# Patient Record
Sex: MALE | Race: WHITE | Employment: OTHER | URBAN - METROPOLITAN AREA
[De-identification: names, ages, dates, MRNs, and addresses within clinical notes are randomized per-mention and may not be internally consistent; named-entity substitution may affect disease eponyms.]

---

## 2018-05-17 ENCOUNTER — HOSPITAL ENCOUNTER (OUTPATIENT)
Dept: MRI IMAGING | Age: 78
Discharge: HOME | End: 2018-05-17
Attending: FAMILY MEDICINE
Payer: MEDICARE

## 2018-05-17 DIAGNOSIS — M54.50 LOW BACK PAIN RADIATING TO RIGHT LEG: ICD-10-CM

## 2018-05-17 DIAGNOSIS — M79.604 LOW BACK PAIN RADIATING TO RIGHT LEG: ICD-10-CM

## 2018-05-17 PROCEDURE — 72148 MRI LUMBAR SPINE W/O DYE: CPT

## 2018-08-21 PROBLEM — M48.061 SPINAL STENOSIS OF LUMBAR REGION: Status: ACTIVE | Noted: 2018-08-21

## 2019-02-12 PROBLEM — I10 ESSENTIAL HYPERTENSION: Status: ACTIVE | Noted: 2019-02-12

## 2019-04-09 ENCOUNTER — HOSPITAL ENCOUNTER (EMERGENCY)
Age: 79
Discharge: LEFT BEFORE DISCHARGE | End: 2019-04-10
Attending: EMERGENCY MEDICINE
Payer: MEDICARE

## 2019-04-09 ENCOUNTER — APPOINTMENT (OUTPATIENT)
Dept: GENERAL RADIOLOGY | Age: 79
End: 2019-04-09
Attending: STUDENT IN AN ORGANIZED HEALTH CARE EDUCATION/TRAINING PROGRAM
Payer: MEDICARE

## 2019-04-09 VITALS
HEIGHT: 65 IN | RESPIRATION RATE: 20 BRPM | TEMPERATURE: 97.8 F | WEIGHT: 175 LBS | BODY MASS INDEX: 29.16 KG/M2 | HEART RATE: 70 BPM | OXYGEN SATURATION: 95 %

## 2019-04-09 LAB
ALBUMIN SERPL-MCNC: 3.9 G/DL (ref 3.2–4.6)
ALBUMIN/GLOB SERPL: 1 {RATIO} (ref 1.2–3.5)
ALP SERPL-CCNC: 108 U/L (ref 50–136)
ALT SERPL-CCNC: 29 U/L (ref 12–65)
ANION GAP SERPL CALC-SCNC: 5 MMOL/L (ref 7–16)
AST SERPL-CCNC: 27 U/L (ref 15–37)
BASOPHILS # BLD: 0.1 K/UL (ref 0–0.2)
BASOPHILS NFR BLD: 1 % (ref 0–2)
BILIRUB SERPL-MCNC: 0.4 MG/DL (ref 0.2–1.1)
BUN SERPL-MCNC: 16 MG/DL (ref 8–23)
CALCIUM SERPL-MCNC: 9.3 MG/DL (ref 8.3–10.4)
CHLORIDE SERPL-SCNC: 111 MMOL/L (ref 98–107)
CO2 SERPL-SCNC: 27 MMOL/L (ref 21–32)
CREAT SERPL-MCNC: 1.27 MG/DL (ref 0.8–1.5)
DIFFERENTIAL METHOD BLD: ABNORMAL
EOSINOPHIL # BLD: 0.2 K/UL (ref 0–0.8)
EOSINOPHIL NFR BLD: 3 % (ref 0.5–7.8)
ERYTHROCYTE [DISTWIDTH] IN BLOOD BY AUTOMATED COUNT: 14.1 % (ref 11.9–14.6)
GLOBULIN SER CALC-MCNC: 4 G/DL (ref 2.3–3.5)
GLUCOSE SERPL-MCNC: 111 MG/DL (ref 65–100)
HCT VFR BLD AUTO: 43.8 % (ref 41.1–50.3)
HGB BLD-MCNC: 14.9 G/DL (ref 13.6–17.2)
IMM GRANULOCYTES # BLD AUTO: 0.1 K/UL (ref 0–0.5)
IMM GRANULOCYTES NFR BLD AUTO: 1 % (ref 0–5)
LYMPHOCYTES # BLD: 3 K/UL (ref 0.5–4.6)
LYMPHOCYTES NFR BLD: 36 % (ref 13–44)
MCH RBC QN AUTO: 29 PG (ref 26.1–32.9)
MCHC RBC AUTO-ENTMCNC: 34 G/DL (ref 31.4–35)
MCV RBC AUTO: 85.4 FL (ref 79.6–97.8)
MONOCYTES # BLD: 0.7 K/UL (ref 0.1–1.3)
MONOCYTES NFR BLD: 8 % (ref 4–12)
NEUTS SEG # BLD: 4.5 K/UL (ref 1.7–8.2)
NEUTS SEG NFR BLD: 53 % (ref 43–78)
NRBC # BLD: 0 K/UL (ref 0–0.2)
PLATELET # BLD AUTO: 326 K/UL (ref 150–450)
PMV BLD AUTO: 9.3 FL (ref 9.4–12.3)
POTASSIUM SERPL-SCNC: 3.7 MMOL/L (ref 3.5–5.1)
PROT SERPL-MCNC: 7.9 G/DL (ref 6.3–8.2)
RBC # BLD AUTO: 5.13 M/UL (ref 4.23–5.6)
SODIUM SERPL-SCNC: 143 MMOL/L (ref 136–145)
TROPONIN I BLD-MCNC: 0 NG/ML (ref 0.02–0.05)
WBC # BLD AUTO: 8.5 K/UL (ref 4.3–11.1)

## 2019-04-09 PROCEDURE — 93005 ELECTROCARDIOGRAM TRACING: CPT | Performed by: STUDENT IN AN ORGANIZED HEALTH CARE EDUCATION/TRAINING PROGRAM

## 2019-04-09 PROCEDURE — 93005 ELECTROCARDIOGRAM TRACING: CPT | Performed by: EMERGENCY MEDICINE

## 2019-04-09 PROCEDURE — 75810000275 HC EMERGENCY DEPT VISIT NO LEVEL OF CARE: Performed by: EMERGENCY MEDICINE

## 2019-04-09 PROCEDURE — 84484 ASSAY OF TROPONIN QUANT: CPT

## 2019-04-09 PROCEDURE — 85025 COMPLETE CBC W/AUTO DIFF WBC: CPT

## 2019-04-09 PROCEDURE — 80053 COMPREHEN METABOLIC PANEL: CPT

## 2019-04-10 LAB
ATRIAL RATE: 72 BPM
CALCULATED P AXIS, ECG09: 61 DEGREES
CALCULATED R AXIS, ECG10: -53 DEGREES
CALCULATED T AXIS, ECG11: 43 DEGREES
DIAGNOSIS, 93000: NORMAL
P-R INTERVAL, ECG05: 158 MS
Q-T INTERVAL, ECG07: 422 MS
QRS DURATION, ECG06: 96 MS
QTC CALCULATION (BEZET), ECG08: 462 MS
VENTRICULAR RATE, ECG03: 72 BPM

## 2019-04-10 NOTE — ED NOTES
Pt informed registration that he is feeling better and would like to leave. Triage removed IV and pt left without seeing physician.

## 2019-04-10 NOTE — ED NOTES
Pt states leaving, ambulatory to triage, 20g to right ac discontinued. Bleeding controlled on leaving ed. Family with pt upon leaving

## 2019-04-10 NOTE — ED TRIAGE NOTES
Patient reports chest pain that started 20 min ago that is mid sternal that is constant, dull P 2/10, no provoking factors. Patient reports no prior cardiac history. Patient does report history of hiatal hernia. Denies taking medication for pain.

## 2019-06-05 PROBLEM — Z01.810 PREOP CARDIOVASCULAR EXAM: Status: ACTIVE | Noted: 2019-06-05

## 2019-06-12 ENCOUNTER — HOSPITAL ENCOUNTER (OUTPATIENT)
Dept: SLEEP MEDICINE | Age: 79
Discharge: HOME | End: 2019-06-12
Payer: MEDICARE

## 2019-06-12 PROCEDURE — 95811 POLYSOM 6/>YRS CPAP 4/> PARM: CPT

## 2019-06-25 PROBLEM — G47.33 OSA (OBSTRUCTIVE SLEEP APNEA): Status: ACTIVE | Noted: 2019-06-25

## 2019-07-09 PROBLEM — R07.2 PRECORDIAL CHEST PAIN: Status: ACTIVE | Noted: 2019-07-09

## 2019-07-10 RX ORDER — ASPIRIN 81 MG/1
81 TABLET ORAL DAILY
COMMUNITY
End: 2022-04-29 | Stop reason: ALTCHOICE

## 2019-07-10 NOTE — PROGRESS NOTES
Patient pre-assessment complete for [facility] poss with Dr Hannah Mcgrath scheduled for 19 at 2pm, arrival time 12pm. Patient verified using . Patient instructed to bring all home medications in labeled bottles on the day of procedure. NPO status reinforced. Patient informed to take a full dose aspirin 325mg  or 81 mg x 4 on the day of procedure. Instructed they can take all other medications excluding vitamins & supplements. Patient verbalizes understanding of all instructions & denies any questions at this time.

## 2019-07-11 ENCOUNTER — HOSPITAL ENCOUNTER (OUTPATIENT)
Dept: CARDIAC CATH/INVASIVE PROCEDURES | Age: 79
Discharge: HOME | End: 2019-07-11
Attending: INTERNAL MEDICINE | Admitting: INTERNAL MEDICINE
Payer: MEDICARE

## 2019-07-11 VITALS
HEIGHT: 65 IN | SYSTOLIC BLOOD PRESSURE: 141 MMHG | DIASTOLIC BLOOD PRESSURE: 81 MMHG | RESPIRATION RATE: 15 BRPM | WEIGHT: 175 LBS | OXYGEN SATURATION: 97 % | BODY MASS INDEX: 29.16 KG/M2 | HEART RATE: 68 BPM

## 2019-07-11 LAB
ANION GAP SERPL CALC-SCNC: 6 MMOL/L (ref 7–16)
ATRIAL RATE: 77 BPM
BUN SERPL-MCNC: 16 MG/DL (ref 8–23)
CALCIUM SERPL-MCNC: 9.2 MG/DL (ref 8.3–10.4)
CALCULATED P AXIS, ECG09: 53 DEGREES
CALCULATED R AXIS, ECG10: -36 DEGREES
CALCULATED T AXIS, ECG11: 42 DEGREES
CHLORIDE SERPL-SCNC: 114 MMOL/L (ref 98–107)
CO2 SERPL-SCNC: 24 MMOL/L (ref 21–32)
CREAT SERPL-MCNC: 1.22 MG/DL (ref 0.8–1.5)
DIAGNOSIS, 93000: NORMAL
ERYTHROCYTE [DISTWIDTH] IN BLOOD BY AUTOMATED COUNT: 13.5 % (ref 11.9–14.6)
GLUCOSE SERPL-MCNC: 94 MG/DL (ref 65–100)
HCT VFR BLD AUTO: 44 % (ref 41.1–50.3)
HGB BLD-MCNC: 15.2 G/DL (ref 13.6–17.2)
INR PPP: 1.1
MAGNESIUM SERPL-MCNC: 2.5 MG/DL (ref 1.8–2.4)
MCH RBC QN AUTO: 28.7 PG (ref 26.1–32.9)
MCHC RBC AUTO-ENTMCNC: 34.5 G/DL (ref 31.4–35)
MCV RBC AUTO: 83.2 FL (ref 79.6–97.8)
NRBC # BLD: 0 K/UL (ref 0–0.2)
P-R INTERVAL, ECG05: 162 MS
PLATELET # BLD AUTO: 313 K/UL (ref 150–450)
PMV BLD AUTO: 9.4 FL (ref 9.4–12.3)
POTASSIUM SERPL-SCNC: 4 MMOL/L (ref 3.5–5.1)
PROTHROMBIN TIME: 13.9 SEC (ref 11.7–14.5)
Q-T INTERVAL, ECG07: 402 MS
QRS DURATION, ECG06: 94 MS
QTC CALCULATION (BEZET), ECG08: 454 MS
RBC # BLD AUTO: 5.29 M/UL (ref 4.23–5.6)
SODIUM SERPL-SCNC: 144 MMOL/L (ref 136–145)
VENTRICULAR RATE, ECG03: 77 BPM
WBC # BLD AUTO: 8.9 K/UL (ref 4.3–11.1)

## 2019-07-11 PROCEDURE — C1769 GUIDE WIRE: HCPCS

## 2019-07-11 PROCEDURE — 83735 ASSAY OF MAGNESIUM: CPT

## 2019-07-11 PROCEDURE — 80048 BASIC METABOLIC PNL TOTAL CA: CPT

## 2019-07-11 PROCEDURE — C1894 INTRO/SHEATH, NON-LASER: HCPCS

## 2019-07-11 PROCEDURE — 77030015766

## 2019-07-11 PROCEDURE — 85610 PROTHROMBIN TIME: CPT

## 2019-07-11 PROCEDURE — 99152 MOD SED SAME PHYS/QHP 5/>YRS: CPT

## 2019-07-11 PROCEDURE — 77030019569 HC BND COMPR RAD TERU -B

## 2019-07-11 PROCEDURE — 74011000250 HC RX REV CODE- 250: Performed by: INTERNAL MEDICINE

## 2019-07-11 PROCEDURE — 74011636320 HC RX REV CODE- 636/320: Performed by: INTERNAL MEDICINE

## 2019-07-11 PROCEDURE — 93458 L HRT ARTERY/VENTRICLE ANGIO: CPT

## 2019-07-11 PROCEDURE — 93005 ELECTROCARDIOGRAM TRACING: CPT | Performed by: INTERNAL MEDICINE

## 2019-07-11 PROCEDURE — 74011250636 HC RX REV CODE- 250/636: Performed by: INTERNAL MEDICINE

## 2019-07-11 PROCEDURE — 93306 TTE W/DOPPLER COMPLETE: CPT

## 2019-07-11 PROCEDURE — 85027 COMPLETE CBC AUTOMATED: CPT

## 2019-07-11 PROCEDURE — 74011250636 HC RX REV CODE- 250/636

## 2019-07-11 PROCEDURE — 77030004534 HC CATH ANGI DX INFN CARD -A

## 2019-07-11 RX ORDER — HEPARIN SODIUM 200 [USP'U]/100ML
3 INJECTION, SOLUTION INTRAVENOUS CONTINUOUS
Status: DISCONTINUED | OUTPATIENT
Start: 2019-07-11 | End: 2019-07-11 | Stop reason: HOSPADM

## 2019-07-11 RX ORDER — SODIUM CHLORIDE 0.9 % (FLUSH) 0.9 %
5-40 SYRINGE (ML) INJECTION EVERY 8 HOURS
Status: DISCONTINUED | OUTPATIENT
Start: 2019-07-11 | End: 2019-07-11 | Stop reason: HOSPADM

## 2019-07-11 RX ORDER — SODIUM CHLORIDE 0.9 % (FLUSH) 0.9 %
5-40 SYRINGE (ML) INJECTION AS NEEDED
Status: DISCONTINUED | OUTPATIENT
Start: 2019-07-11 | End: 2019-07-11 | Stop reason: HOSPADM

## 2019-07-11 RX ORDER — MIDAZOLAM HYDROCHLORIDE 1 MG/ML
.5-2 INJECTION, SOLUTION INTRAMUSCULAR; INTRAVENOUS
Status: DISCONTINUED | OUTPATIENT
Start: 2019-07-11 | End: 2019-07-11 | Stop reason: HOSPADM

## 2019-07-11 RX ORDER — SODIUM CHLORIDE 9 MG/ML
75 INJECTION, SOLUTION INTRAVENOUS CONTINUOUS
Status: DISCONTINUED | OUTPATIENT
Start: 2019-07-11 | End: 2019-07-11 | Stop reason: HOSPADM

## 2019-07-11 RX ORDER — LIDOCAINE HYDROCHLORIDE 10 MG/ML
10-200 INJECTION INFILTRATION; PERINEURAL ONCE
Status: COMPLETED | OUTPATIENT
Start: 2019-07-11 | End: 2019-07-11

## 2019-07-11 RX ORDER — SODIUM CHLORIDE 0.9 % (FLUSH) 0.9 %
5 SYRINGE (ML) INJECTION AS NEEDED
Status: DISCONTINUED | OUTPATIENT
Start: 2019-07-11 | End: 2019-07-11 | Stop reason: HOSPADM

## 2019-07-11 RX ORDER — GUAIFENESIN 100 MG/5ML
324 LIQUID (ML) ORAL ONCE
Status: DISCONTINUED | OUTPATIENT
Start: 2019-07-11 | End: 2019-07-11 | Stop reason: HOSPADM

## 2019-07-11 RX ORDER — FENTANYL CITRATE 50 UG/ML
25-50 INJECTION, SOLUTION INTRAMUSCULAR; INTRAVENOUS
Status: DISCONTINUED | OUTPATIENT
Start: 2019-07-11 | End: 2019-07-11 | Stop reason: HOSPADM

## 2019-07-11 RX ADMIN — FENTANYL CITRATE 25 MCG: 50 INJECTION, SOLUTION INTRAMUSCULAR; INTRAVENOUS at 14:50

## 2019-07-11 RX ADMIN — HEPARIN SODIUM 2 ML: 10000 INJECTION, SOLUTION INTRAVENOUS; SUBCUTANEOUS at 14:50

## 2019-07-11 RX ADMIN — SODIUM CHLORIDE 75 ML/HR: 900 INJECTION, SOLUTION INTRAVENOUS at 12:47

## 2019-07-11 RX ADMIN — MIDAZOLAM HYDROCHLORIDE 2 MG: 1 INJECTION, SOLUTION INTRAMUSCULAR; INTRAVENOUS at 14:50

## 2019-07-11 RX ADMIN — LIDOCAINE HYDROCHLORIDE 2 ML: 10 INJECTION, SOLUTION INFILTRATION; PERINEURAL at 14:50

## 2019-07-11 RX ADMIN — HEPARIN SODIUM 3 ML/HR: 5000 INJECTION, SOLUTION INTRAVENOUS; SUBCUTANEOUS at 14:40

## 2019-07-11 RX ADMIN — IOPAMIDOL 80 ML: 755 INJECTION, SOLUTION INTRAVENOUS at 15:05

## 2019-07-11 NOTE — PROGRESS NOTES
TRANSFER - OUT REPORT:    Verbal report given to Cesar Fernandez on [place].  being transferred to [address] [place] [address] for routine progression of care       Report consisted of patients Situation, Background, Assessment and Recommendations(SBAR). Information from the following report(s) SBAR, Kardex, Procedure Summary and MAR was reviewed with the receiving nurse. Opportunity for questions and clarification was provided.       [facility] with Dr Leydi Bentley  No intervention  2 versed  25 fentanyl  Right radial RBand 12ml at [phone]

## 2019-07-11 NOTE — PROGRESS NOTES
Patient up to bedside, vital signs and site stable. Patient ambulated to bathroom without difficulty. Patient voided without difficulty. Vascular site stable. 1645 Discharge instructions and home medications reviewed with patient. Time allowed for questions and answers. 1700 Patient ambulated second time without difficulty. Site stable after ambulation. Peripheral IV sites dc'd without difficulty with tips intact. 1710 Patient discharged to home with family.

## 2019-07-11 NOTE — DISCHARGE INSTRUCTIONS
HEART CATHETERIZATION/ANGIOGRAPHY DISCHARGE INSTRUCTIONS    1. Check puncture site frequently for swelling or bleeding. If there is any bleeding, lie down and apply pressure over the area with a clean towel or washcloth and call 911. Notify your doctor for any redness, swelling, drainage, or oozing from the puncture site. Notify your doctor for any fever or chills. 2. If the extremity becomes cold, numb, or painful call [address] Cardiology at [phone].  3. Activity should be limited for the next 48 hours. No heavy lifting (anything over 5 pounds) for 3 days. 4. You may resume your usual diet. Drink more fluids than usual.  5. Have a responsible person drive you home and stay with you for at least 24 hours after your heart catheterization/angiography. 6. You may remove bandage from your Right wrist in 24 hours. You may shower in 24 hours. No tub baths, hot tubs, or swimming for 1 week. Do not place any lotions, creams, powders, or ointments over puncture site for 1 week. You may place a clean band-aid over the puncture site each day for 5 days. Change daily. I have read the above instructions and have had the opportunity to ask questions.       Patient: ________________________   Date: 7/11/2019    Witness: _______________________   Date: 7/11/2019

## 2019-07-11 NOTE — PROGRESS NOTES
Patient's family at bedside, patient and family updated on plan of care. Patient given meal tray and beverage, call light within reach.

## 2019-07-11 NOTE — PROGRESS NOTES
Report received from Juanis Agee Cath Lab RN. Procedural findings communicated. Intra procedural  medication administration reviewed. Progression of care discussed.      Patient received into [address] post sheath removal.     Access site without bleeding or swelling yes    Dressing dry and intact yes    Patient instructed to limit movement to right upper extremity    Routine post procedural vital signs and site assessment initiated yes

## 2019-07-11 NOTE — PROGRESS NOTES
Patient received to [address] [place] [address] room # 9  Ambulatory from [facility]. Patient scheduled for [facility] today with Dr Rojelio Jimenez. Procedure reviewed & questions answered, voiced good understanding consent obtained & placed on chart. All medications and medical history reviewed. Will prep patient per orders. Patient & family updated on plan of care. The patient has a fraility score of 3-MANAGING WELL based on ability to perform ADLS by self.

## 2019-07-12 NOTE — PROCEDURES
[facility]  CARDIAC CATH    Name:  Fabiana Cortés  MR#:  [id number]  :  1940  ACCOUNT #:  [de-identified]  DATE OF SERVICE:  2019      PROCEDURES PERFORMED:  Left heart catheterization via the right radial artery with a 5-Kiswahili Tiger catheter and angled pigtail. TR band for hemostasis was used. PREOPERATIVE DIAGNOSES:  Chest pain and abnormal stress test with a large defect showing infarction and susy-infarct ischemia in the inferolateral wall, referred for catheterization. POSTOPERATIVE DIAGNOSES:  noncardiac cp. SURGEON:  Nadir Garcia MD    ASSISTANT:  None. ESTIMATED BLOOD LOSS:  Less than 5 mL. SPECIMENS REMOVED:  None. COMPLICATIONS:  None. IMPLANTS:  None. ANESTHESIA:  Conscious sedation was given under my direct supervision by Alma Valdes RN, beginning at 02:50, concluding at 03:20. A total of 2 mg Versed and 25 mcg of fentanyl were given. Vital signs and saturation were stable throughout. FINDINGS:  Left ventriculogram reveals normal LV systolic function. Ejection fraction is 60%. Left ventricular end-diastolic pressure is 12 mmHg with an opening aortic pressure of 130/78. No gradient across the aortic valve. The left main coronary artery is in the normal anatomic position, free of significant disease, bifurcates into LAD and circumflex system. The LAD has 2 small diagonal branches, tapers out before the apex. There is no atherosclerosis seen in the LAD system. Circumflex has a moderate-sized midvessel obtuse marginal, small distal obtuse marginal branch. There is no atherosclerosis seen in the circumflex system. The right coronary artery is a large dominant vessel, normal anatomic position. There is mild irregularities in the distal portion of the RCA of approximately 10%. The PDA and posterolateral obtuse marginal branches are free of significant disease. CONCLUSION:  1.   Normal coronary angiography with minimal coronary artery disease. 2.  Mildly dilated aorta. RECOMMENDATIONS:  The patient will have an echocardiogram and if the aorta is significantly dilated, consideration for outpatient CT scan will be performed.       Umang Galindo MD      [id number]  D:  07/11/2019 15:15  T:  07/11/2019 15:23  JOB #:  [id number]

## 2019-09-17 PROBLEM — K59.09 OTHER CONSTIPATION: Status: ACTIVE | Noted: 2019-09-17

## 2019-09-20 PROBLEM — Z01.810 PREOP CARDIOVASCULAR EXAM: Status: RESOLVED | Noted: 2019-06-05 | Resolved: 2019-09-20

## 2019-09-23 ENCOUNTER — HOSPITAL ENCOUNTER (OUTPATIENT)
Dept: ULTRASOUND IMAGING | Age: 79
Discharge: HOME | End: 2019-09-23
Attending: UROLOGY
Payer: MEDICARE

## 2019-09-23 DIAGNOSIS — R31.0 GROSS HEMATURIA: ICD-10-CM

## 2019-09-23 PROCEDURE — 76770 US EXAM ABDO BACK WALL COMP: CPT

## 2020-01-09 ENCOUNTER — HOSPITAL ENCOUNTER (OUTPATIENT)
Dept: LAB | Age: 80
Discharge: HOME | End: 2020-01-09

## 2020-01-09 PROCEDURE — 88305 TISSUE EXAM BY PATHOLOGIST: CPT

## 2021-01-12 PROBLEM — Z78.9 DIFFICULTY WITH CPAP FULL FACE MASK USE: Status: ACTIVE | Noted: 2021-01-12

## 2021-01-16 ENCOUNTER — APPOINTMENT (OUTPATIENT)
Dept: CT IMAGING | Age: 81
DRG: 176 | End: 2021-01-16
Attending: STUDENT IN AN ORGANIZED HEALTH CARE EDUCATION/TRAINING PROGRAM
Payer: MEDICARE

## 2021-01-16 ENCOUNTER — HOSPITAL ENCOUNTER (INPATIENT)
Age: 81
LOS: 1 days | Discharge: HOME | DRG: 176 | End: 2021-01-19
Attending: STUDENT IN AN ORGANIZED HEALTH CARE EDUCATION/TRAINING PROGRAM | Admitting: INTERNAL MEDICINE
Payer: MEDICARE

## 2021-01-16 ENCOUNTER — APPOINTMENT (OUTPATIENT)
Dept: GENERAL RADIOLOGY | Age: 81
DRG: 176 | End: 2021-01-16
Attending: STUDENT IN AN ORGANIZED HEALTH CARE EDUCATION/TRAINING PROGRAM
Payer: MEDICARE

## 2021-01-16 DIAGNOSIS — I26.99 OTHER ACUTE PULMONARY EMBOLISM WITHOUT ACUTE COR PULMONALE (HCC): ICD-10-CM

## 2021-01-16 DIAGNOSIS — I26.94 MULTIPLE SUBSEGMENTAL PULMONARY EMBOLI WITHOUT ACUTE COR PULMONALE (HCC): Primary | ICD-10-CM

## 2021-01-16 PROBLEM — R07.2 PRECORDIAL CHEST PAIN: Status: RESOLVED | Noted: 2019-07-09 | Resolved: 2021-01-16

## 2021-01-16 PROBLEM — D72.829 LEUKOCYTOSIS: Status: ACTIVE | Noted: 2021-01-16

## 2021-01-16 LAB
ALBUMIN SERPL-MCNC: 3.4 G/DL (ref 3.2–4.6)
ALBUMIN/GLOB SERPL: 0.7 {RATIO} (ref 1.2–3.5)
ALP SERPL-CCNC: 102 U/L (ref 50–136)
ALT SERPL-CCNC: 38 U/L (ref 12–65)
ANION GAP SERPL CALC-SCNC: 7 MMOL/L (ref 7–16)
APTT PPP: 31.8 SEC (ref 24.1–35.1)
AST SERPL-CCNC: 28 U/L (ref 15–37)
BASOPHILS # BLD: 0.1 K/UL (ref 0–0.2)
BASOPHILS NFR BLD: 0 % (ref 0–2)
BILIRUB SERPL-MCNC: 0.8 MG/DL (ref 0.2–1.1)
BNP SERPL-MCNC: 225 PG/ML
BUN SERPL-MCNC: 13 MG/DL (ref 8–23)
CALCIUM SERPL-MCNC: 9.2 MG/DL (ref 8.3–10.4)
CHLORIDE SERPL-SCNC: 109 MMOL/L (ref 98–107)
CO2 SERPL-SCNC: 27 MMOL/L (ref 21–32)
CREAT SERPL-MCNC: 1.06 MG/DL (ref 0.8–1.5)
DIFFERENTIAL METHOD BLD: ABNORMAL
EOSINOPHIL # BLD: 0.2 K/UL (ref 0–0.8)
EOSINOPHIL NFR BLD: 1 % (ref 0.5–7.8)
ERYTHROCYTE [DISTWIDTH] IN BLOOD BY AUTOMATED COUNT: 13.9 % (ref 11.9–14.6)
GLOBULIN SER CALC-MCNC: 4.6 G/DL (ref 2.3–3.5)
GLUCOSE SERPL-MCNC: 109 MG/DL (ref 65–100)
HCT VFR BLD AUTO: 41.7 % (ref 41.1–50.3)
HGB BLD-MCNC: 13.9 G/DL (ref 13.6–17.2)
IMM GRANULOCYTES # BLD AUTO: 0.1 K/UL (ref 0–0.5)
IMM GRANULOCYTES NFR BLD AUTO: 1 % (ref 0–5)
LYMPHOCYTES # BLD: 2.8 K/UL (ref 0.5–4.6)
LYMPHOCYTES NFR BLD: 23 % (ref 13–44)
MCH RBC QN AUTO: 28.1 PG (ref 26.1–32.9)
MCHC RBC AUTO-ENTMCNC: 33.3 G/DL (ref 31.4–35)
MCV RBC AUTO: 84.4 FL (ref 79.6–97.8)
MONOCYTES # BLD: 0.9 K/UL (ref 0.1–1.3)
MONOCYTES NFR BLD: 7 % (ref 4–12)
NEUTS SEG # BLD: 8.2 K/UL (ref 1.7–8.2)
NEUTS SEG NFR BLD: 67 % (ref 43–78)
NRBC # BLD: 0 K/UL (ref 0–0.2)
PLATELET # BLD AUTO: 262 K/UL (ref 150–450)
PMV BLD AUTO: 9 FL (ref 9.4–12.3)
POTASSIUM SERPL-SCNC: 3.6 MMOL/L (ref 3.5–5.1)
PROT SERPL-MCNC: 8 G/DL (ref 6.3–8.2)
RBC # BLD AUTO: 4.94 M/UL (ref 4.23–5.6)
SODIUM SERPL-SCNC: 143 MMOL/L (ref 136–145)
TROPONIN-HIGH SENSITIVITY: 14.3 PG/ML (ref 0–14)
WBC # BLD AUTO: 12.2 K/UL (ref 4.3–11.1)

## 2021-01-16 PROCEDURE — 74011000258 HC RX REV CODE- 258: Performed by: STUDENT IN AN ORGANIZED HEALTH CARE EDUCATION/TRAINING PROGRAM

## 2021-01-16 PROCEDURE — 96375 TX/PRO/DX INJ NEW DRUG ADDON: CPT

## 2021-01-16 PROCEDURE — 74011000636 HC RX REV CODE- 636: Performed by: STUDENT IN AN ORGANIZED HEALTH CARE EDUCATION/TRAINING PROGRAM

## 2021-01-16 PROCEDURE — 85730 THROMBOPLASTIN TIME PARTIAL: CPT

## 2021-01-16 PROCEDURE — 96376 TX/PRO/DX INJ SAME DRUG ADON: CPT

## 2021-01-16 PROCEDURE — 84484 ASSAY OF TROPONIN QUANT: CPT

## 2021-01-16 PROCEDURE — 83880 ASSAY OF NATRIURETIC PEPTIDE: CPT

## 2021-01-16 PROCEDURE — 80053 COMPREHEN METABOLIC PANEL: CPT

## 2021-01-16 PROCEDURE — 74011250636 HC RX REV CODE- 250/636: Performed by: STUDENT IN AN ORGANIZED HEALTH CARE EDUCATION/TRAINING PROGRAM

## 2021-01-16 PROCEDURE — 85025 COMPLETE CBC W/AUTO DIFF WBC: CPT

## 2021-01-16 PROCEDURE — 93005 ELECTROCARDIOGRAM TRACING: CPT | Performed by: STUDENT IN AN ORGANIZED HEALTH CARE EDUCATION/TRAINING PROGRAM

## 2021-01-16 PROCEDURE — 71260 CT THORAX DX C+: CPT

## 2021-01-16 PROCEDURE — 99218 HC RM OBSERVATION: CPT

## 2021-01-16 PROCEDURE — 71046 X-RAY EXAM CHEST 2 VIEWS: CPT

## 2021-01-16 PROCEDURE — 99284 EMERGENCY DEPT VISIT MOD MDM: CPT

## 2021-01-16 PROCEDURE — 96374 THER/PROPH/DIAG INJ IV PUSH: CPT

## 2021-01-16 RX ORDER — KETOROLAC TROMETHAMINE 30 MG/ML
30 INJECTION, SOLUTION INTRAMUSCULAR; INTRAVENOUS
Status: COMPLETED | OUTPATIENT
Start: 2021-01-16 | End: 2021-01-16

## 2021-01-16 RX ORDER — SODIUM CHLORIDE 0.9 % (FLUSH) 0.9 %
10 SYRINGE (ML) INJECTION
Status: COMPLETED | OUTPATIENT
Start: 2021-01-16 | End: 2021-01-16

## 2021-01-16 RX ORDER — KETOROLAC TROMETHAMINE 30 MG/ML
15 INJECTION, SOLUTION INTRAMUSCULAR; INTRAVENOUS
Status: DISCONTINUED | OUTPATIENT
Start: 2021-01-16 | End: 2021-01-16

## 2021-01-16 RX ORDER — HEPARIN SODIUM 5000 [USP'U]/ML
80 INJECTION, SOLUTION INTRAVENOUS; SUBCUTANEOUS ONCE
Status: COMPLETED | OUTPATIENT
Start: 2021-01-16 | End: 2021-01-17

## 2021-01-16 RX ORDER — MORPHINE SULFATE 4 MG/ML
4 INJECTION INTRAVENOUS
Status: COMPLETED | OUTPATIENT
Start: 2021-01-16 | End: 2021-01-16

## 2021-01-16 RX ORDER — ONDANSETRON 2 MG/ML
4 INJECTION INTRAMUSCULAR; INTRAVENOUS
Status: COMPLETED | OUTPATIENT
Start: 2021-01-16 | End: 2021-01-16

## 2021-01-16 RX ORDER — HEPARIN SODIUM 5000 [USP'U]/100ML
18-36 INJECTION, SOLUTION INTRAVENOUS
Status: DISCONTINUED | OUTPATIENT
Start: 2021-01-16 | End: 2021-01-18

## 2021-01-16 RX ADMIN — KETOROLAC TROMETHAMINE 30 MG: 30 INJECTION, SOLUTION INTRAMUSCULAR at 21:53

## 2021-01-16 RX ADMIN — Medication 10 ML: at 22:11

## 2021-01-16 RX ADMIN — IOPAMIDOL 75 ML: 755 INJECTION, SOLUTION INTRAVENOUS at 22:11

## 2021-01-16 RX ADMIN — ONDANSETRON 4 MG: 2 INJECTION INTRAMUSCULAR; INTRAVENOUS at 22:37

## 2021-01-16 RX ADMIN — MORPHINE SULFATE 4 MG: 4 INJECTION INTRAVENOUS at 22:37

## 2021-01-16 RX ADMIN — SODIUM CHLORIDE 100 ML: 900 INJECTION, SOLUTION INTRAVENOUS at 22:11

## 2021-01-17 LAB
ANION GAP SERPL CALC-SCNC: 6 MMOL/L (ref 7–16)
APTT PPP: 94.6 SEC (ref 24.1–35.1)
ATRIAL RATE: 394 BPM
BASOPHILS # BLD: 0.1 K/UL (ref 0–0.2)
BASOPHILS NFR BLD: 1 % (ref 0–2)
BUN SERPL-MCNC: 12 MG/DL (ref 8–23)
CALCIUM SERPL-MCNC: 9 MG/DL (ref 8.3–10.4)
CALCULATED R AXIS, ECG10: -43 DEGREES
CALCULATED T AXIS, ECG11: 17 DEGREES
CHLORIDE SERPL-SCNC: 109 MMOL/L (ref 98–107)
CO2 SERPL-SCNC: 25 MMOL/L (ref 21–32)
CREAT SERPL-MCNC: 0.91 MG/DL (ref 0.8–1.5)
DIAGNOSIS, 93000: NORMAL
DIFFERENTIAL METHOD BLD: ABNORMAL
EOSINOPHIL # BLD: 0.1 K/UL (ref 0–0.8)
EOSINOPHIL NFR BLD: 1 % (ref 0.5–7.8)
ERYTHROCYTE [DISTWIDTH] IN BLOOD BY AUTOMATED COUNT: 13.8 % (ref 11.9–14.6)
GLUCOSE SERPL-MCNC: 97 MG/DL (ref 65–100)
HCT VFR BLD AUTO: 38.3 % (ref 41.1–50.3)
HGB BLD-MCNC: 13 G/DL (ref 13.6–17.2)
IMM GRANULOCYTES # BLD AUTO: 0.1 K/UL (ref 0–0.5)
IMM GRANULOCYTES NFR BLD AUTO: 1 % (ref 0–5)
LYMPHOCYTES # BLD: 2.1 K/UL (ref 0.5–4.6)
LYMPHOCYTES NFR BLD: 20 % (ref 13–44)
MCH RBC QN AUTO: 28.9 PG (ref 26.1–32.9)
MCHC RBC AUTO-ENTMCNC: 33.9 G/DL (ref 31.4–35)
MCV RBC AUTO: 85.1 FL (ref 79.6–97.8)
MONOCYTES # BLD: 0.9 K/UL (ref 0.1–1.3)
MONOCYTES NFR BLD: 9 % (ref 4–12)
NEUTS SEG # BLD: 7.1 K/UL (ref 1.7–8.2)
NEUTS SEG NFR BLD: 69 % (ref 43–78)
NRBC # BLD: 0 K/UL (ref 0–0.2)
PLATELET # BLD AUTO: 227 K/UL (ref 150–450)
PMV BLD AUTO: 9.4 FL (ref 9.4–12.3)
POTASSIUM SERPL-SCNC: 3.6 MMOL/L (ref 3.5–5.1)
Q-T INTERVAL, ECG07: 338 MS
QRS DURATION, ECG06: 90 MS
QTC CALCULATION (BEZET), ECG08: 415 MS
RBC # BLD AUTO: 4.5 M/UL (ref 4.23–5.6)
SODIUM SERPL-SCNC: 140 MMOL/L (ref 136–145)
UFH PPP CHRO-ACNC: 0.37 IU/ML (ref 0.3–0.7)
UFH PPP CHRO-ACNC: 0.53 IU/ML (ref 0.3–0.7)
VENTRICULAR RATE, ECG03: 91 BPM
WBC # BLD AUTO: 10.3 K/UL (ref 4.3–11.1)

## 2021-01-17 PROCEDURE — 85025 COMPLETE CBC W/AUTO DIFF WBC: CPT

## 2021-01-17 PROCEDURE — 74011250636 HC RX REV CODE- 250/636: Performed by: FAMILY MEDICINE

## 2021-01-17 PROCEDURE — 80048 BASIC METABOLIC PNL TOTAL CA: CPT

## 2021-01-17 PROCEDURE — 85520 HEPARIN ASSAY: CPT

## 2021-01-17 PROCEDURE — 74011250637 HC RX REV CODE- 250/637: Performed by: INTERNAL MEDICINE

## 2021-01-17 PROCEDURE — 96365 THER/PROPH/DIAG IV INF INIT: CPT

## 2021-01-17 PROCEDURE — 99218 HC RM OBSERVATION: CPT

## 2021-01-17 PROCEDURE — 96376 TX/PRO/DX INJ SAME DRUG ADON: CPT

## 2021-01-17 PROCEDURE — 96366 THER/PROPH/DIAG IV INF ADDON: CPT

## 2021-01-17 PROCEDURE — 74011250636 HC RX REV CODE- 250/636: Performed by: STUDENT IN AN ORGANIZED HEALTH CARE EDUCATION/TRAINING PROGRAM

## 2021-01-17 PROCEDURE — 85730 THROMBOPLASTIN TIME PARTIAL: CPT

## 2021-01-17 PROCEDURE — 36415 COLL VENOUS BLD VENIPUNCTURE: CPT

## 2021-01-17 PROCEDURE — 74011250637 HC RX REV CODE- 250/637: Performed by: FAMILY MEDICINE

## 2021-01-17 PROCEDURE — 93306 TTE W/DOPPLER COMPLETE: CPT

## 2021-01-17 RX ORDER — HEPARIN SODIUM 5000 [USP'U]/100ML
18-36 INJECTION, SOLUTION INTRAVENOUS
Status: DISCONTINUED | OUTPATIENT
Start: 2021-01-17 | End: 2021-01-17

## 2021-01-17 RX ORDER — ASPIRIN 81 MG/1
81 TABLET ORAL DAILY
Status: DISCONTINUED | OUTPATIENT
Start: 2021-01-17 | End: 2021-01-19 | Stop reason: HOSPADM

## 2021-01-17 RX ORDER — POLYETHYLENE GLYCOL 3350 17 G/17G
17 POWDER, FOR SOLUTION ORAL DAILY
Status: DISCONTINUED | OUTPATIENT
Start: 2021-01-17 | End: 2021-01-19 | Stop reason: HOSPADM

## 2021-01-17 RX ORDER — SODIUM CHLORIDE 0.9 % (FLUSH) 0.9 %
5-40 SYRINGE (ML) INJECTION AS NEEDED
Status: DISCONTINUED | OUTPATIENT
Start: 2021-01-17 | End: 2021-01-19 | Stop reason: HOSPADM

## 2021-01-17 RX ORDER — OXYCODONE AND ACETAMINOPHEN 7.5; 325 MG/1; MG/1
1 TABLET ORAL
Status: DISCONTINUED | OUTPATIENT
Start: 2021-01-17 | End: 2021-01-19 | Stop reason: HOSPADM

## 2021-01-17 RX ORDER — MORPHINE SULFATE 2 MG/ML
2 INJECTION, SOLUTION INTRAMUSCULAR; INTRAVENOUS
Status: DISCONTINUED | OUTPATIENT
Start: 2021-01-17 | End: 2021-01-19 | Stop reason: HOSPADM

## 2021-01-17 RX ORDER — PANTOPRAZOLE SODIUM 40 MG/1
40 TABLET, DELAYED RELEASE ORAL
Status: DISCONTINUED | OUTPATIENT
Start: 2021-01-17 | End: 2021-01-19 | Stop reason: HOSPADM

## 2021-01-17 RX ORDER — SODIUM CHLORIDE 0.9 % (FLUSH) 0.9 %
5-40 SYRINGE (ML) INJECTION EVERY 8 HOURS
Status: DISCONTINUED | OUTPATIENT
Start: 2021-01-17 | End: 2021-01-19 | Stop reason: HOSPADM

## 2021-01-17 RX ADMIN — Medication 10 ML: at 04:17

## 2021-01-17 RX ADMIN — HEPARIN SODIUM 18 UNITS/KG/HR: 5000 INJECTION, SOLUTION INTRAVENOUS at 00:40

## 2021-01-17 RX ADMIN — PANTOPRAZOLE SODIUM 40 MG: 40 TABLET, DELAYED RELEASE ORAL at 04:17

## 2021-01-17 RX ADMIN — HEPARIN SODIUM 18 UNITS/KG/HR: 5000 INJECTION, SOLUTION INTRAVENOUS at 19:11

## 2021-01-17 RX ADMIN — MORPHINE SULFATE 2 MG: 2 INJECTION, SOLUTION INTRAMUSCULAR; INTRAVENOUS at 11:26

## 2021-01-17 RX ADMIN — PANTOPRAZOLE SODIUM 40 MG: 40 TABLET, DELAYED RELEASE ORAL at 16:28

## 2021-01-17 RX ADMIN — POLYETHYLENE GLYCOL 3350 17 G: 17 POWDER, FOR SOLUTION ORAL at 09:48

## 2021-01-17 RX ADMIN — MORPHINE SULFATE 2 MG: 2 INJECTION, SOLUTION INTRAMUSCULAR; INTRAVENOUS at 19:25

## 2021-01-17 RX ADMIN — OXYCODONE HYDROCHLORIDE AND ACETAMINOPHEN 1 TABLET: 7.5; 325 TABLET ORAL at 13:28

## 2021-01-17 RX ADMIN — MORPHINE SULFATE 2 MG: 2 INJECTION, SOLUTION INTRAMUSCULAR; INTRAVENOUS at 04:17

## 2021-01-17 RX ADMIN — Medication 10 ML: at 22:24

## 2021-01-17 RX ADMIN — OXYCODONE HYDROCHLORIDE AND ACETAMINOPHEN 1 TABLET: 7.5; 325 TABLET ORAL at 23:50

## 2021-01-17 RX ADMIN — HEPARIN SODIUM 6300 UNITS: 5000 INJECTION INTRAVENOUS; SUBCUTANEOUS at 00:29

## 2021-01-17 RX ADMIN — Medication 5 ML: at 14:33

## 2021-01-17 RX ADMIN — ASPIRIN 81 MG: 81 TABLET ORAL at 09:48

## 2021-01-17 NOTE — ED PROVIDER NOTES
79-year-old male patient with a history of pulmonary embolism presents to the emergency department with reports of right-sided chest wall pain. Symptoms started yesterday. Patient states he has been working in his yard and feels as though he may have pulled a muscle. He describes lifting heavy bags of yard waste and carrying them across the yard. He denies any specific falls or trauma. Pain is described as a sharp pain in the right chest just under the nipple that is constant nature and worsened with palpation and movement. He states the pain does not radiate at this time. He does report significant shortness of breath and very mild cough. Patient reports history of recent COVID-19 diagnosis at the beginning of June or December. He states he was in [place] at the time and return to [place] via plane on December 30. In addition, patient reports a history of pulmonary embolism many years ago, he is not currently anticoagulated.            Past Medical History:   Diagnosis Date    Bladder tumor     see copy of records    GERD (gastroesophageal reflux disease)     Hiatal hernia     Hypertension     Psychiatric disorder     Pulmonary emboli (HCC)     Sleep apnea        Past Surgical History:   Procedure Laterality Date    CARDIAC SURG PROCEDURE UNLIST      7/2019    HX UROLOGICAL      bladder surgery for tumor         Family History:   Problem Relation Age of Onset    Diabetes Father     Heart Disease Father         MI 77 yo   Vogel Cancer Sister         breast  62s    Diabetes Sister     Cancer Paternal Aunt         9 sisters:  colon       Social History     Socioeconomic History    Marital status:      Spouse name: Not on file    Number of children: Not on file    Years of education: Not on file    Highest education level: Not on file   Occupational History    Not on file   Social Needs    Financial resource strain: Not on file    Food insecurity     Worry: Not on file Inability: Not on file    Transportation needs     Medical: Not on file     Non-medical: Not on file   Tobacco Use    Smoking status: Former Smoker     Packs/day: 1.00     Years: 2.00     Pack years: 2.00     Types: Cigarettes     Quit date: 1964     Years since quittin.1    Smokeless tobacco: Never Used   Substance and Sexual Activity    Alcohol use: Yes     Comment: occasional    Drug use: No    Sexual activity: Not on file   Lifestyle    Physical activity     Days per week: Not on file     Minutes per session: Not on file    Stress: Not on file   Relationships    Social connections     Talks on phone: Not on file     Gets together: Not on file     Attends Yarsanism service: Not on file     Active member of club or organization: Not on file     Attends meetings of clubs or organizations: Not on file     Relationship status: Not on file    Intimate partner violence     Fear of current or ex partner: Not on file     Emotionally abused: Not on file     Physically abused: Not on file     Forced sexual activity: Not on file   Other Topics Concern    Not on file   Social History Narrative    Not on file         ALLERGIES: Erythromycin    Review of Systems   Constitutional: Negative for chills, diaphoresis and fever. HENT: Negative for congestion, sneezing and sore throat. Eyes: Negative for visual disturbance. Respiratory: Positive for cough and shortness of breath. Negative for chest tightness and wheezing. Cardiovascular: Positive for chest pain. Negative for leg swelling. Gastrointestinal: Negative for abdominal pain, blood in stool, diarrhea, nausea and vomiting. Endocrine: Negative for polyuria. Genitourinary: Negative for difficulty urinating, dysuria, flank pain, hematuria and urgency. Musculoskeletal: Negative for back pain, myalgias, neck pain and neck stiffness. Skin: Negative for color change and rash.    Neurological: Negative for dizziness, syncope, speech difficulty, weakness, light-headedness, numbness and headaches. Psychiatric/Behavioral: Negative for behavioral problems. All other systems reviewed and are negative. Vitals:    01/16/21 2017   BP: (!) 177/92   Pulse: 87   Resp: 17   Temp: 98.1 °F (36.7 °C)   SpO2: 96%   Weight: 78.9 kg (174 lb)   Height: 5' 5\" (1.651 m)            Physical Exam  Vitals signs and nursing note reviewed. Constitutional:       General: He is not in acute distress. Appearance: He is well-developed. He is not diaphoretic. Comments: Alert and oriented to person place and time. No acute distress, speaks in clear, fluid sentences. Appears uncomfortable   HENT:      Head: Normocephalic and atraumatic. Right Ear: External ear normal.      Left Ear: External ear normal.      Nose: Nose normal.   Eyes:      Pupils: Pupils are equal, round, and reactive to light. Neck:      Musculoskeletal: Normal range of motion. Cardiovascular:      Rate and Rhythm: Normal rate and regular rhythm. Heart sounds: Normal heart sounds. No murmur. No friction rub. No gallop. Pulmonary:      Effort: Pulmonary effort is normal. No respiratory distress. Breath sounds: No stridor. Decreased breath sounds present. No wheezing, rhonchi or rales. Comments: Poor inspiratory effort. Chest:      Chest wall: No tenderness. Comments: There is no evidence of skin rash, some reproducible tenderness with palpation of this area of the patient's chest though no palpable crepitus or subcu emphysema is appreciated. Abdominal:      General: There is no distension. Palpations: Abdomen is soft. There is no mass. Tenderness: There is no abdominal tenderness. There is no guarding or rebound. Hernia: No hernia is present. Musculoskeletal: Normal range of motion. General: No tenderness or deformity. Skin:     General: Skin is warm and dry.    Neurological:      Mental Status: He is alert and oriented to person, place, and time. Cranial Nerves: No cranial nerve deficit. MDM  Number of Diagnoses or Management Options  Multiple subsegmental pulmonary emboli without acute cor pulmonale [facility]): new and requires workup  Diagnosis management comments: Patient reports recent travel, recent COVID-19 diagnosis and history of prior pulmonary embolism. We will proceed with CT imaging. Chest x-ray shows patchy infiltrates most pronounced over the right side. The imaging shows extensive bilateral pulmonary embolus for heparin bolus and infusion, consulted hospitalist who agrees to evaluate for admission. Voice dictation software was used during the making of this note. This software is not perfect and grammatical and other typographical errors may be present. This note has been proofread, but may still contain errors. [address] Gil[facility]; 1/16/2021 @10:38 PM   ===================================================================         Amount and/or Complexity of Data Reviewed  Clinical lab tests: ordered and reviewed  Tests in the radiology section of CPT®: ordered and reviewed  Tests in the medicine section of CPT®: ordered and reviewed  Discuss the patient with other providers: yes  Independent visualization of images, tracings, or specimens: yes    Risk of Complications, Morbidity, and/or Mortality  Presenting problems: high  Diagnostic procedures: high  Management options: high    Patient Progress  Patient progress: stable    ED Course as of Jan 16 2236   Sat Jan 16, 2021   2208 EKG obtained on arrival shows a rhythm with a rate of 91 beats a minute. No evidence of acute ischemic change. Significant underlying artifact present.     [BR]      ED Course User Index  [BR] Dale Acosta DO       Critical Care  Performed by: Dale Acosta DO  Authorized by: Dale Acosta DO     Critical care provider statement:     Critical care time (minutes):  36    Critical care was necessary to treat or prevent imminent or life-threatening deterioration of the following conditions:  Respiratory failure and cardiac failure    Critical care was time spent personally by me on the following activities:  Blood draw for specimens, development of treatment plan with patient or surrogate, discussions with consultants, evaluation of patient's response to treatment, examination of patient, ordering and performing treatments and interventions, ordering and review of laboratory studies, ordering and review of radiographic studies, pulse oximetry, re-evaluation of patient's condition and review of old charts    I assumed direction of critical care for this patient from another provider in my specialty: no

## 2021-01-17 NOTE — PROGRESS NOTES
Hospitalist Progress Note     Admit Date:  2021  9:40 PM   Name:  Tamela Greenwood. Age:  79 y.o.  :  1940   MRN:  [id number]   PCP:  Claudia Syed MD  Treatment Team: Attending Provider: Rob Walker MD; Charge Nurse: Alvin Rodriguez; Utilization Review: Dora Vega    Subjective:     2021-patient seen and evaluated. He was admitted overnight with a PE he is currently on a heparin drip. He is very irate about his keys being lost by staff in the ER. Nursing note on the floor trying to see if they can locate his keys. Patient's wife states that the patient can be like a dog with a bone about something and the ski situation has been really worked up. He still has chest pain which is his biggest issue. Especially with deep breathing. Morphine was not working and he was transitioned to [employer] and we will see if this works to improve his pain. Objective:     Patient Vitals for the past 24 hrs:   Temp Pulse Resp BP SpO2   21 1520 98.2 °F (36.8 °C) (!) 113 18 125/75 91 %   21 1108 98 °F (36.7 °C) 90 20 (!) 142/80 95 %   21 0749 98 °F (36.7 °C) 89 19 133/75 96 %   21 0419 97.8 °F (36.6 °C) 84 18 (!) 166/88 95 %   21 0037 98.1 °F (36.7 °C) 94 18 (!) 154/87 93 %   21 -- 92 -- -- 97 %   21 98.1 °F (36.7 °C) 87 17 (!) 177/92 96 %     Oxygen Therapy  O2 Sat (%): 91 % (21 1520)  Pulse via Oximetry: 92 beats per minute (21)  O2 Device: Room air (21)  No intake or output data in the 24 hours ending 21 1543      General:    Well nourished. Alert. CV:   RRR. No murmur, rub, or gallop. Lungs:   CTAB. No wheezing, rhonchi, or rales. Abdomen:   Soft, nontender, nondistended. Extremities: Warm and dry. No cyanosis or edema. Skin:     No rashes or jaundice. Data Review:  I have reviewed all labs, meds, telemetry events, and studies from the last 24 hours.     Recent Results (from the past 24 hour(s))   CBC WITH AUTOMATED DIFF    Collection Time: 01/16/21  8:24 PM   Result Value Ref Range    WBC 12.2 (H) 4.3 - 11.1 K/uL    RBC 4.94 4.23 - 5.6 M/uL    HGB 13.9 13.6 - 17.2 g/dL    HCT 41.7 41.1 - 50.3 %    MCV 84.4 79.6 - 97.8 FL    MCH 28.1 26.1 - 32.9 PG    MCHC 33.3 31.4 - 35.0 g/dL    RDW 13.9 11.9 - 14.6 %    PLATELET 242 690 - 652 K/uL    MPV 9.0 (L) 9.4 - 12.3 FL    ABSOLUTE NRBC 0.00 0.0 - 0.2 K/uL    DF AUTOMATED      NEUTROPHILS 67 43 - 78 %    LYMPHOCYTES 23 13 - 44 %    MONOCYTES 7 4.0 - 12.0 %    EOSINOPHILS 1 0.5 - 7.8 %    BASOPHILS 0 0.0 - 2.0 %    IMMATURE GRANULOCYTES 1 0.0 - 5.0 %    ABS. NEUTROPHILS 8.2 1.7 - 8.2 K/UL    ABS. LYMPHOCYTES 2.8 0.5 - 4.6 K/UL    ABS. MONOCYTES 0.9 0.1 - 1.3 K/UL    ABS. EOSINOPHILS 0.2 0.0 - 0.8 K/UL    ABS. BASOPHILS 0.1 0.0 - 0.2 K/UL    ABS. IMM. GRANS. 0.1 0.0 - 0.5 K/UL   METABOLIC PANEL, COMPREHENSIVE    Collection Time: 01/16/21  8:24 PM   Result Value Ref Range    Sodium 143 136 - 145 mmol/L    Potassium 3.6 3.5 - 5.1 mmol/L    Chloride 109 (H) 98 - 107 mmol/L    CO2 27 21 - 32 mmol/L    Anion gap 7 7 - 16 mmol/L    Glucose 109 (H) 65 - 100 mg/dL    BUN 13 8 - 23 MG/DL    Creatinine 1.06 0.8 - 1.5 MG/DL    GFR est AA >60 >60 ml/min/1.73m2    GFR est non-AA >60 >60 ml/min/1.73m2    Calcium 9.2 8.3 - 10.4 MG/DL    Bilirubin, total 0.8 0.2 - 1.1 MG/DL    ALT (SGPT) 38 12 - 65 U/L    AST (SGOT) 28 15 - 37 U/L    Alk.  phosphatase 102 50 - 136 U/L    Protein, total 8.0 6.3 - 8.2 g/dL    Albumin 3.4 3.2 - 4.6 g/dL    Globulin 4.6 (H) 2.3 - 3.5 g/dL    A-G Ratio 0.7 (L) 1.2 - 3.5     TROPONIN-HIGH SENSITIVITY    Collection Time: 01/16/21  8:24 PM   Result Value Ref Range    Troponin-High Sensitivity 14.3 (H) 0 - 14 pg/mL   NT-PRO BNP    Collection Time: 01/16/21  8:24 PM   Result Value Ref Range    NT pro- <450 PG/ML   PTT    Collection Time: 01/16/21  8:24 PM   Result Value Ref Range    aPTT 31.8 24.1 - 35.1 SEC   EKG, 12 LEAD, INITIAL    Collection Time: 01/16/21 10:07 PM   Result Value Ref Range    Ventricular Rate 91 BPM    Atrial Rate 394 BPM    QRS Duration 90 ms    Q-T Interval 338 ms    QTC Calculation (Bezet) 415 ms    Calculated R Axis -43 degrees    Calculated T Axis 17 degrees    Diagnosis       !! AGE AND GENDER SPECIFIC ECG ANALYSIS !! Normal sinus rhythm  Left axis deviation  Inferior infarct , age undetermined  Abnormal ECG  When compared with ECG of 11-JUL-2019 12:33,      Confirmed by KATELYNN YAP (), REGIS SUTTON ([id number]) on 1/17/2021 3:36:41 PM     PTT    Collection Time: 01/17/21  5:00 AM   Result Value Ref Range    aPTT 94.6 (H) 24.1 - 35.1 SEC   CBC WITH AUTOMATED DIFF    Collection Time: 01/17/21  5:00 AM   Result Value Ref Range    WBC 10.3 4.3 - 11.1 K/uL    RBC 4.50 4.23 - 5.6 M/uL    HGB 13.0 (L) 13.6 - 17.2 g/dL    HCT 38.3 (L) 41.1 - 50.3 %    MCV 85.1 79.6 - 97.8 FL    MCH 28.9 26.1 - 32.9 PG    MCHC 33.9 31.4 - 35.0 g/dL    RDW 13.8 11.9 - 14.6 %    PLATELET 500 502 - 201 K/uL    MPV 9.4 9.4 - 12.3 FL    ABSOLUTE NRBC 0.00 0.0 - 0.2 K/uL    DF AUTOMATED      NEUTROPHILS 69 43 - 78 %    LYMPHOCYTES 20 13 - 44 %    MONOCYTES 9 4.0 - 12.0 %    EOSINOPHILS 1 0.5 - 7.8 %    BASOPHILS 1 0.0 - 2.0 %    IMMATURE GRANULOCYTES 1 0.0 - 5.0 %    ABS. NEUTROPHILS 7.1 1.7 - 8.2 K/UL    ABS. LYMPHOCYTES 2.1 0.5 - 4.6 K/UL    ABS. MONOCYTES 0.9 0.1 - 1.3 K/UL    ABS. EOSINOPHILS 0.1 0.0 - 0.8 K/UL    ABS. BASOPHILS 0.1 0.0 - 0.2 K/UL    ABS. IMM.  GRANS. 0.1 0.0 - 0.5 K/UL   METABOLIC PANEL, BASIC    Collection Time: 01/17/21  5:00 AM   Result Value Ref Range    Sodium 140 136 - 145 mmol/L    Potassium 3.6 3.5 - 5.1 mmol/L    Chloride 109 (H) 98 - 107 mmol/L    CO2 25 21 - 32 mmol/L    Anion gap 6 (L) 7 - 16 mmol/L    Glucose 97 65 - 100 mg/dL    BUN 12 8 - 23 MG/DL    Creatinine 0.91 0.8 - 1.5 MG/DL    GFR est AA >60 >60 ml/min/1.73m2    GFR est non-AA >60 >60 ml/min/1.73m2    Calcium 9.0 8.3 - 10.4 MG/DL   HEPARIN XA UFH Collection Time: 01/17/21  6:41 AM   Result Value Ref Range    Heparin Xa UFH 0.53 0.3 - 0.7 IU/mL        All Micro Results     None          Current Meds:  Current Facility-Administered Medications   Medication Dose Route Frequency    aspirin delayed-release tablet 81 mg  81 mg Oral DAILY    pantoprazole (PROTONIX) tablet 40 mg  40 mg Oral ACB&D    polyethylene glycol (MIRALAX) packet 17 g  17 g Oral DAILY    sodium chloride (NS) flush 5-40 mL  5-40 mL IntraVENous Q8H    sodium chloride (NS) flush 5-40 mL  5-40 mL IntraVENous PRN    morphine injection 2 mg  2 mg IntraVENous Q4H PRN    oxyCODONE-acetaminophen (PERCOCET 7.5) 7.5-325 mg per tablet 1 Tab  1 Tab Oral Q4H PRN    heparin 25,000 units in dextrose 500 mL infusion  18-36 Units/kg/hr IntraVENous TITRATE       Other Studies (last 24 hours):  Xr Chest Pa Lat    Result Date: 1/16/2021  TWO-VIEW CHEST: CLINICAL HISTORY: Right-sided chest pain for 3 days. Now with mild leukocytosis. COMPARISON:  None. FINDINGS: PA and lateral chest images demonstrate no acute confluent infiltrate or significant pleural fluid collection. However, there is atelectasis/infiltrate at the right lung base as well as subtle patchy infiltrates scattered elsewhere bilaterally. The heart size is within normal limits without evidence of congestive heart failure or pneumothorax. The bony thorax appears intact on these views. IMPRESSION:  RIGHT BASILAR ATELECTASIS/INFILTRATE WITH SUBTLE PATCHY INFILTRATE ELSEWHERE BILATERALLY. Ct Chest W Cont    Result Date: 1/16/2021  CHEST CT ANGIOGRAPHY WITH ADDITIONAL REFORMATS:  CLINICAL HISTORY:  Right chest pain for 3 days. Now with mild leukocytosis TECHNIQUE:  During bolus injection of nonionic intravenous contrast, the chest was scanned with spiral technique, and coronal reformats were produced.  Radiation dose reduction was achieved using one or all of the following techniques: automated exposure control, weight-based dosing, iterative reconstruction. COMPARISON:  Radiographs today. FINDINGS: Multifocal intraluminal soft tissue density consistent with acute pulmonary embolism is present in both lower lobes, the right middle lobe, in the right upper lobe. There is associated scattered atelectasis/infiltrate, most marked in the right lower lobe in association with the largest clot burden. There is no definite pneumothorax. No pathologically enlarged lymph nodes or abnormal fluid collection is seen. The epigastrium appears unremarkable as imaged. IMPRESSION:  EXTENSIVE BILATERAL PULMONARY EMBOLISM WITH SPARING OF THE LEFT UPPER LOBE. Preliminary results were reported to Dr. Lorrie Cardona in the ER on January 16, 2021 at 1025 hours by myself via telephone. 789 Critical results were communicated as outlined in Section II.C.2.a.i of the ACR Practice Guideline for Communication of Diagnostic Imaging Findings. Assessment and Plan:     Hospital Problems as of 1/17/2021 Date Reviewed: 6/15/2020          Codes Class Noted - Resolved POA    * (Principal) Acute pulmonary embolus ([address] ICD-10-CM: I26.99  ICD-9-CM: 415.19  1/16/2021 - Present Yes        Leukocytosis ICD-10-CM: B71.145  ICD-9-CM: 288.60  1/16/2021 - Present Yes        [facility] (obstructive sleep apnea) ICD-10-CM: G47.33  ICD-9-CM: 327.23  6/25/2019 - Present Yes        Essential hypertension ICD-10-CM: I10  ICD-9-CM: 401.9  2/12/2019 - Present Yes              PLAN:    · Acute PE-continue heparin drip. We will follow-up with IR to see if he is a candidate for any intervention. If not can transition to Eliquis and discharged to home if medically stable.   We will get a walk test prior to discharge  · MERCY stable-  · Hypertension-continue current medical management    DC planning/Dispo: Home if IR plans no intervention after transitioning to p.o. medications for anticoagulation  DVT ppx: Heparin    Signed:  Jesse Yanez MD       This note was created with the help of [employer] voice recognition software. Although the note was reviewed for errors and corrected were noted. Of note additional errors and inconsistencies may remain in this note secondary to the use of Dragon voice recognition software.

## 2021-01-17 NOTE — ED TRIAGE NOTES
Pt ambulatory unassisted to triage with mask in place. Complains of R sided chest pain x 3 days. Reports doing yard work and lifting bags with R arm. Reports pain worsens with movement and deep breaths. Reports attempting OTC pain meds with no relief. Hx of PE x 10 years ago. Denies SOB.

## 2021-01-17 NOTE — ED NOTES
TRANSFER - OUT REPORT:    Verbal report given to Mauri(name) on Morris Marquez.  being transferred to 720(unit) for routine progression of care       Report consisted of patients Situation, Background, Assessment and   Recommendations(SBAR). Information from the following report(s) ED Summary was reviewed with the receiving nurse. Lines:   Peripheral IV 01/16/21 Right Antecubital (Active)   Site Assessment Clean, dry, & intact 01/16/21 2024   Phlebitis Assessment 0 01/16/21 2024   Infiltration Assessment 0 01/16/21 2024   Dressing Status Clean, dry, & intact 01/16/21 2024        Opportunity for questions and clarification was provided.       Patient transported with:   [employer]

## 2021-01-17 NOTE — PROGRESS NOTES
01/17/21 0044   Dual Skin Pressure Injury Assessment   Dual Skin Pressure Injury Assessment WDL   Second Care Provider (Based on [address] [place] [address]) Kirti Bryant RN   Skin Integumentary   Skin Integumentary (WDL) WDL    Pressure  Injury Documentation No Pressure Injury Noted-Pressure Ulcer Prevention Initiated   Skin Color Appropriate for ethnicity   Skin Condition/Temp Warm   Skin Integrity Intact   Turgor Non-tenting   Hair Growth Present   Nails WDL   Varicosities Absent   Wound Prevention and Protection Methods   Orientation of Wound Prevention Posterior   Location of Wound Prevention Sacrum/Coccyx   Dressing Present  No   Wound Offloading (Prevention Methods) Bed, pressure reduction mattress   Sacrum and Heels intact

## 2021-01-17 NOTE — H&P
HOSPITALIST INITIAL HISTORY AND PHYSICAL    NAME:  Carolee Redding. Age:  79 y.o.  :   1940   MRN:   [id number]  PCP: Rosario Zapata MD  Consulting MD:  Treatment Team: Attending Provider: Mathieu Ivey MD    CHIEF COMPLAINT: SOB, R chest wall pain    HISTORY OF PRESENT ILLNESS:   Carolee Redding. is an 79 y.o. male with a past medical history of HTN, MERCY, previous PE about 8-10 years ago who presents to the ER with report of sharp stabbing R lower chest wall pain that began a few days ago and got worse today. He was out working in his yard and had a stabbing pain in his ribs, leading him to think he had pulled a muscle. However, the pain did not get better with Tylenol or Ibuprofen. He reports some improvement in the pain now as he lies still. Does admit to worse pain when he takes a deep breath. Also admits to feeling some mild shortness of breath for the past several days. Returned from a trip to [place] at the end of December but denies any other long trips or periods of inactivity. Denies any fevers, chills, coughs . REVIEW OF SYSTEMS: Comprehensive ROS performed. Denies fevers, chills, cough, congestion, otherwise negative. Past Medical History:   Diagnosis Date    Bladder tumor     see copy of records    GERD (gastroesophageal reflux disease)     Hiatal hernia     Hypertension     Psychiatric disorder     [address] [address] [facility]     Sleep apnea         Past Surgical History:   Procedure Laterality Date    CARDIAC SURG PROCEDURE UNLIST      2019    HX UROLOGICAL      bladder surgery for tumor       Prior to Admission Medications   Prescriptions Last Dose Informant Patient Reported? Taking?   acetaminophen (TYLENOL EXTRA STRENGTH) 500 mg tablet   Yes No   Sig: Take 1,000 mg by mouth every six (6) hours as needed for Pain. Takes two tabs at bedtime   aspirin delayed-release 81 mg tablet   Yes No   Sig: Take 81 mg by mouth daily.    cyanocobalamin (VITAMIN B-12) 1,000 mcg tablet   Yes No   Sig: Take 1,000 mcg by mouth daily. multivit-min-FA-lycopen-lutein (CENTRUM SILVER) 0.4-300-250 mg-mcg-mcg tab   Yes No   Sig: Take  by mouth. omeprazole (PRILOSEC) 20 mg capsule   No No   Sig: Take 1 Cap by mouth two (2) times a day. polyethylene glycol (MIRALAX) 17 gram/dose powder   Yes No   Sig: Take 17 g by mouth daily. Facility-Administered Medications: None       Allergies   Allergen Reactions    Erythromycin Hives       FAMILY HISTORY: Reviewed. Negative except   Family History   Problem Relation Age of Onset    Diabetes Father     Heart Disease Father         MI 75 yo   Will Spina Cancer Sister         breast  62s    Diabetes Sister     Cancer Paternal Aunt         5 sisters:  colon       Social History     Tobacco Use    Smoking status: Former Smoker     Packs/day: 1.00     Years: 2.00     Pack years: 2.00     Types: Cigarettes     Quit date: 1964     Years since quittin.1    Smokeless tobacco: Never Used   Substance Use Topics    Alcohol use: Yes     Comment: occasional         Objective:     Visit Vitals  BP (!) 154/87 (BP 1 Location: Right arm, BP Patient Position: At rest)   Pulse 94   Temp 98.1 °F (36.7 °C)   Resp 18   Ht 5' 5\" (1.651 m)   Wt 78.9 kg (174 lb)   SpO2 93%   BMI 28.96 kg/m²      Temp (24hrs), Av.1 °F (36.7 °C), Min:98.1 °F (36.7 °C), Max:98.1 °F (36.7 °C)    Oxygen Therapy  O2 Sat (%): 93 % (21 0037)  Pulse via Oximetry: 92 beats per minute (219)  O2 Device: Room air (21)  Physical Exam:  General:    The patient is a pleasant eldelry male in no acute distress. Head:   Normocephalic/atraumatic. Eyes:  No palpebral pallor or scleral icterus. ENT:  External auricular and nasal exam within normal limits. Mucous membranes are moist.  Neck:  Supple, non-tender, no JVD. Lungs:   Clear to auscultation bilaterally without wheezes or crackles. No respiratory distress or accessory muscle use.   Heart:   Regular rate and rhythm, without murmurs, rubs, or gallops. Abdomen:   Soft, non-tender, non-distended with normoactive bowel sounds. Genitourinary: No tenderness over the bladder or bilateral CVAs. Extremities: Without clubbing, cyanosis, or edema. Skin:     Normal color, texture, and turgor. No rashes, lesions, or jaundice. Pulses: Radial and dorsalis pedis pulses present 2+ bilaterally. Capillary refill <2s. Neurologic: CN II-XII grossly intact and symmetrical.     Moving all four extremities well with no focal deficits. Psychiatric: Pleasant demeanor, appropriate affect. Alert and oriented x 3      Data Review:   Recent Results (from the past 24 hour(s))   CBC WITH AUTOMATED DIFF    Collection Time: 01/16/21  8:24 PM   Result Value Ref Range    WBC 12.2 (H) 4.3 - 11.1 K/uL    RBC 4.94 4.23 - 5.6 M/uL    HGB 13.9 13.6 - 17.2 g/dL    HCT 41.7 41.1 - 50.3 %    MCV 84.4 79.6 - 97.8 FL    MCH 28.1 26.1 - 32.9 PG    MCHC 33.3 31.4 - 35.0 g/dL    RDW 13.9 11.9 - 14.6 %    PLATELET 623 265 - 838 K/uL    MPV 9.0 (L) 9.4 - 12.3 FL    ABSOLUTE NRBC 0.00 0.0 - 0.2 K/uL    DF AUTOMATED      NEUTROPHILS 67 43 - 78 %    LYMPHOCYTES 23 13 - 44 %    MONOCYTES 7 4.0 - 12.0 %    EOSINOPHILS 1 0.5 - 7.8 %    BASOPHILS 0 0.0 - 2.0 %    IMMATURE GRANULOCYTES 1 0.0 - 5.0 %    ABS. NEUTROPHILS 8.2 1.7 - 8.2 K/UL    ABS. LYMPHOCYTES 2.8 0.5 - 4.6 K/UL    ABS. MONOCYTES 0.9 0.1 - 1.3 K/UL    ABS. EOSINOPHILS 0.2 0.0 - 0.8 K/UL    ABS. BASOPHILS 0.1 0.0 - 0.2 K/UL    ABS. IMM.  GRANS. 0.1 0.0 - 0.5 K/UL   METABOLIC PANEL, COMPREHENSIVE    Collection Time: 01/16/21  8:24 PM   Result Value Ref Range    Sodium 143 136 - 145 mmol/L    Potassium 3.6 3.5 - 5.1 mmol/L    Chloride 109 (H) 98 - 107 mmol/L    CO2 27 21 - 32 mmol/L    Anion gap 7 7 - 16 mmol/L    Glucose 109 (H) 65 - 100 mg/dL    BUN 13 8 - 23 MG/DL    Creatinine 1.06 0.8 - 1.5 MG/DL    GFR est AA >60 >60 ml/min/1.73m2    GFR est non-AA >60 >60 ml/min/1.73m2    Calcium 9.2 8.3 - 10.4 MG/DL    Bilirubin, total 0.8 0.2 - 1.1 MG/DL    ALT (SGPT) 38 12 - 65 U/L    AST (SGOT) 28 15 - 37 U/L    Alk. phosphatase 102 50 - 136 U/L    Protein, total 8.0 6.3 - 8.2 g/dL    Albumin 3.4 3.2 - 4.6 g/dL    Globulin 4.6 (H) 2.3 - 3.5 g/dL    A-G Ratio 0.7 (L) 1.2 - 3.5     TROPONIN-HIGH SENSITIVITY    Collection Time: 01/16/21  8:24 PM   Result Value Ref Range    Troponin-High Sensitivity 14.3 (H) 0 - 14 pg/mL   NT-PRO BNP    Collection Time: 01/16/21  8:24 PM   Result Value Ref Range    NT pro- <450 PG/ML   PTT    Collection Time: 01/16/21  8:24 PM   Result Value Ref Range    aPTT 31.8 24.1 - 35.1 SEC       Imaging /Procedures /Studies:  Xr Chest Pa Lat    Result Date: 1/16/2021  IMPRESSION:  RIGHT BASILAR ATELECTASIS/INFILTRATE WITH SUBTLE PATCHY INFILTRATE ELSEWHERE BILATERALLY. Ct Chest W Cont    Result Date: 1/16/2021  IMPRESSION:  EXTENSIVE BILATERAL PULMONARY EMBOLISM WITH SPARING OF THE LEFT UPPER LOBE. Preliminary results were reported to Dr. Buford Cabot in the ER on January 16, 2021 at 1025 hours by myself via telephone. 789 Critical results were communicated as outlined in Section II.C.2.a.i of the ACR Practice Guideline for Communication of Diagnostic Imaging Findings. Assessment and Plan:     Principal Problem:    Acute pulmonary embolus ([address] (1/16/2021)    Heparin gtt, TTE in AM to evaluate for need of EKOS. Wean O2 as tolerated. Active Problems:    Leukocytosis (1/16/2021)    Follow CBC      [facility] (obstructive sleep apnea) (6/25/2019)    Stable. Essential hypertension (2/12/2019)    Stable. Continue home meds. Code Status: FULL CODE      Disposition: Observe on remote tele for evaluation and treatment as per above.       Anticipated discharge: < 2 midnights     Signed By: Benito Ng MD     January 17, 2021

## 2021-01-18 PROBLEM — I26.99 PULMONARY EMBOLISM (HCC): Status: ACTIVE | Noted: 2021-01-18

## 2021-01-18 LAB
ANION GAP SERPL CALC-SCNC: 4 MMOL/L (ref 7–16)
BASOPHILS # BLD: 0.1 K/UL (ref 0–0.2)
BASOPHILS NFR BLD: 0 % (ref 0–2)
BUN SERPL-MCNC: 12 MG/DL (ref 8–23)
CALCIUM SERPL-MCNC: 8.9 MG/DL (ref 8.3–10.4)
CHLORIDE SERPL-SCNC: 108 MMOL/L (ref 98–107)
CO2 SERPL-SCNC: 27 MMOL/L (ref 21–32)
CREAT SERPL-MCNC: 1.07 MG/DL (ref 0.8–1.5)
DIFFERENTIAL METHOD BLD: ABNORMAL
EOSINOPHIL # BLD: 0.1 K/UL (ref 0–0.8)
EOSINOPHIL NFR BLD: 1 % (ref 0.5–7.8)
ERYTHROCYTE [DISTWIDTH] IN BLOOD BY AUTOMATED COUNT: 13.5 % (ref 11.9–14.6)
GLUCOSE SERPL-MCNC: 137 MG/DL (ref 65–100)
HCT VFR BLD AUTO: 38.3 % (ref 41.1–50.3)
HGB BLD-MCNC: 12.9 G/DL (ref 13.6–17.2)
IMM GRANULOCYTES # BLD AUTO: 0.1 K/UL (ref 0–0.5)
IMM GRANULOCYTES NFR BLD AUTO: 1 % (ref 0–5)
LYMPHOCYTES # BLD: 2.2 K/UL (ref 0.5–4.6)
LYMPHOCYTES NFR BLD: 18 % (ref 13–44)
MCH RBC QN AUTO: 28.5 PG (ref 26.1–32.9)
MCHC RBC AUTO-ENTMCNC: 33.7 G/DL (ref 31.4–35)
MCV RBC AUTO: 84.7 FL (ref 79.6–97.8)
MONOCYTES # BLD: 1.2 K/UL (ref 0.1–1.3)
MONOCYTES NFR BLD: 10 % (ref 4–12)
NEUTS SEG # BLD: 8.3 K/UL (ref 1.7–8.2)
NEUTS SEG NFR BLD: 70 % (ref 43–78)
NRBC # BLD: 0 K/UL (ref 0–0.2)
PLATELET # BLD AUTO: 255 K/UL (ref 150–450)
PMV BLD AUTO: 9.1 FL (ref 9.4–12.3)
POTASSIUM SERPL-SCNC: 3.5 MMOL/L (ref 3.5–5.1)
PROCALCITONIN SERPL-MCNC: 0.17 NG/ML
RBC # BLD AUTO: 4.52 M/UL (ref 4.23–5.6)
SODIUM SERPL-SCNC: 139 MMOL/L (ref 136–145)
UFH PPP CHRO-ACNC: 0.39 IU/ML (ref 0.3–0.7)
WBC # BLD AUTO: 11.8 K/UL (ref 4.3–11.1)

## 2021-01-18 PROCEDURE — 65660000000 HC RM CCU STEPDOWN

## 2021-01-18 PROCEDURE — 74011250637 HC RX REV CODE- 250/637: Performed by: INTERNAL MEDICINE

## 2021-01-18 PROCEDURE — 84145 PROCALCITONIN (PCT): CPT

## 2021-01-18 PROCEDURE — 99218 HC RM OBSERVATION: CPT

## 2021-01-18 PROCEDURE — 74011250637 HC RX REV CODE- 250/637: Performed by: FAMILY MEDICINE

## 2021-01-18 PROCEDURE — 85025 COMPLETE CBC W/AUTO DIFF WBC: CPT

## 2021-01-18 PROCEDURE — 74011250636 HC RX REV CODE- 250/636: Performed by: STUDENT IN AN ORGANIZED HEALTH CARE EDUCATION/TRAINING PROGRAM

## 2021-01-18 PROCEDURE — 85520 HEPARIN ASSAY: CPT

## 2021-01-18 PROCEDURE — 36415 COLL VENOUS BLD VENIPUNCTURE: CPT

## 2021-01-18 PROCEDURE — 80048 BASIC METABOLIC PNL TOTAL CA: CPT

## 2021-01-18 PROCEDURE — 96366 THER/PROPH/DIAG IV INF ADDON: CPT

## 2021-01-18 RX ADMIN — PANTOPRAZOLE SODIUM 40 MG: 40 TABLET, DELAYED RELEASE ORAL at 09:00

## 2021-01-18 RX ADMIN — APIXABAN 10 MG: 5 TABLET, FILM COATED ORAL at 17:29

## 2021-01-18 RX ADMIN — ASPIRIN 81 MG: 81 TABLET ORAL at 09:00

## 2021-01-18 RX ADMIN — PANTOPRAZOLE SODIUM 40 MG: 40 TABLET, DELAYED RELEASE ORAL at 17:28

## 2021-01-18 RX ADMIN — HEPARIN SODIUM 18 UNITS/KG/HR: 5000 INJECTION, SOLUTION INTRAVENOUS at 12:34

## 2021-01-18 RX ADMIN — OXYCODONE HYDROCHLORIDE AND ACETAMINOPHEN 1 TABLET: 7.5; 325 TABLET ORAL at 23:12

## 2021-01-18 RX ADMIN — Medication 5 ML: at 13:45

## 2021-01-18 RX ADMIN — Medication 10 ML: at 05:52

## 2021-01-18 RX ADMIN — POLYETHYLENE GLYCOL 3350 17 G: 17 POWDER, FOR SOLUTION ORAL at 09:01

## 2021-01-18 RX ADMIN — Medication 10 ML: at 21:12

## 2021-01-18 NOTE — PROGRESS NOTES
Hospitalist Progress Note     Admit Date:  2021  9:40 PM   Name:  Daksha Latif. Age:  79 y.o.  :  1940   MRN:  [id number]   PCP:  Pritesh Marcum MD  Treatment Team: Attending Provider: Satnam Marin MD; Utilization Review: Mac Uriarte; Nurse Navigator: Dom James RN; Charge Nurse: Alexa Delcid RN; Charge Nurse: Ana Lilia Wilhelm    Subjective:     2021-patient seen and evaluated. He was admitted overnight with a PE he is currently on a heparin drip. He is very irate about his keys being lost by staff in the ER. Nursing staff on the floor trying to see if they can locate his keys. Patient's wife states that the patient can be like a dog with a bone about something and the situation situation has him really worked up. He still has chest pain which is his biggest issue. Especially with deep breathing. Morphine was not working and he was transitioned to [employer] and we will see if this works to improve his pain. 2021-patient seen and evaluated. He was febrile overnight to 100.8. He is n.p.o. this morning with plans for thrombectomy/thrombolysis today with IR. He is still very upset about his keys. And would like to see someone in administration about it. Objective:     Patient Vitals for the past 24 hrs:   Temp Pulse Resp BP SpO2   21 0742 98.1 °F (36.7 °C) 84 20 123/74 93 %   21 0508 97.8 °F (36.6 °C) 83 18 110/72 91 %   21 2353 99.5 °F (37.5 °C) 97 18 (!) 147/82 95 %   21 2019 (!) 100.8 °F (38.2 °C) 98 20 (!) 141/80 94 %   21 1520 98.2 °F (36.8 °C) (!) 113 18 125/75 91 %   21 1108 98 °F (36.7 °C) 90 20 (!) 142/80 95 %     Oxygen Therapy  O2 Sat (%): 93 % (21 0742)  Pulse via Oximetry: 92 beats per minute (21)  O2 Device: Room air (21)  No intake or output data in the 24 hours ending 21 0934      General:    Well nourished. Alert. CV:   RRR.   No murmur, rub, or gallop. Lungs:   CTAB. No wheezing, rhonchi, or rales. Abdomen:   Soft, nontender, nondistended. Extremities: Warm and dry. No cyanosis or edema. Skin:     No rashes or jaundice. Data Review:  I have reviewed all labs, meds, telemetry events, and studies from the last 24 hours. Recent Results (from the past 24 hour(s))   HEPARIN XA UFH    Collection Time: 01/17/21  2:28 PM   Result Value Ref Range    Heparin Xa UFH 0.37 0.3 - 0.7 IU/mL   CBC WITH AUTOMATED DIFF    Collection Time: 01/18/21  3:57 AM   Result Value Ref Range    WBC 11.8 (H) 4.3 - 11.1 K/uL    RBC 4.52 4.23 - 5.6 M/uL    HGB 12.9 (L) 13.6 - 17.2 g/dL    HCT 38.3 (L) 41.1 - 50.3 %    MCV 84.7 79.6 - 97.8 FL    MCH 28.5 26.1 - 32.9 PG    MCHC 33.7 31.4 - 35.0 g/dL    RDW 13.5 11.9 - 14.6 %    PLATELET 435 611 - 059 K/uL    MPV 9.1 (L) 9.4 - 12.3 FL    ABSOLUTE NRBC 0.00 0.0 - 0.2 K/uL    DF AUTOMATED      NEUTROPHILS 70 43 - 78 %    LYMPHOCYTES 18 13 - 44 %    MONOCYTES 10 4.0 - 12.0 %    EOSINOPHILS 1 0.5 - 7.8 %    BASOPHILS 0 0.0 - 2.0 %    IMMATURE GRANULOCYTES 1 0.0 - 5.0 %    ABS. NEUTROPHILS 8.3 (H) 1.7 - 8.2 K/UL    ABS. LYMPHOCYTES 2.2 0.5 - 4.6 K/UL    ABS. MONOCYTES 1.2 0.1 - 1.3 K/UL    ABS. EOSINOPHILS 0.1 0.0 - 0.8 K/UL    ABS. BASOPHILS 0.1 0.0 - 0.2 K/UL    ABS. IMM.  GRANS. 0.1 0.0 - 0.5 K/UL   METABOLIC PANEL, BASIC    Collection Time: 01/18/21  3:57 AM   Result Value Ref Range    Sodium 139 136 - 145 mmol/L    Potassium 3.5 3.5 - 5.1 mmol/L    Chloride 108 (H) 98 - 107 mmol/L    CO2 27 21 - 32 mmol/L    Anion gap 4 (L) 7 - 16 mmol/L    Glucose 137 (H) 65 - 100 mg/dL    BUN 12 8 - 23 MG/DL    Creatinine 1.07 0.8 - 1.5 MG/DL    GFR est AA >60 >60 ml/min/1.73m2    GFR est non-AA >60 >60 ml/min/1.73m2    Calcium 8.9 8.3 - 10.4 MG/DL   HEPARIN XA UFH    Collection Time: 01/18/21  3:57 AM   Result Value Ref Range    Heparin Xa UFH 0.39 0.3 - 0.7 IU/mL        All Micro Results     None          Current Meds:  Current Facility-Administered Medications   Medication Dose Route Frequency    aspirin delayed-release tablet 81 mg  81 mg Oral DAILY    pantoprazole (PROTONIX) tablet 40 mg  40 mg Oral ACB&D    polyethylene glycol (MIRALAX) packet 17 g  17 g Oral DAILY    sodium chloride (NS) flush 5-40 mL  5-40 mL IntraVENous Q8H    sodium chloride (NS) flush 5-40 mL  5-40 mL IntraVENous PRN    morphine injection 2 mg  2 mg IntraVENous Q4H PRN    oxyCODONE-acetaminophen (PERCOCET 7.5) 7.5-325 mg per tablet 1 Tab  1 Tab Oral Q4H PRN    heparin 25,000 units in dextrose 500 mL infusion  18-36 Units/kg/hr IntraVENous TITRATE       Other Studies (last 24 hours):  No results found. Assessment and Plan:     Hospital Problems as of 1/18/2021 Date Reviewed: 6/15/2020          Codes Class Noted - Resolved POA    Pulmonary embolism ([facility] ICD-10-CM: I26.99  ICD-9-CM: 415.19  1/18/2021 - Present Unknown        * (Principal) Acute pulmonary embolus ([facility] ICD-10-CM: I26.99  ICD-9-CM: 415.19  1/16/2021 - Present Yes        Leukocytosis ICD-10-CM: F09.651  ICD-9-CM: 288.60  1/16/2021 - Present Yes        [facility] (obstructive sleep apnea) ICD-10-CM: G47.33  ICD-9-CM: 327.23  6/25/2019 - Present Yes        Essential hypertension ICD-10-CM: I10  ICD-9-CM: 401.9  2/12/2019 - Present Yes              PLAN:    · Acute PE-continue heparin drip. I have made him n.p.o. this morning for possible intervention. We will monitor and follow  · Fevers overnight-could be secondary to the PEG we will check a procalcitonin. Low threshold for antibiotics should fevers persist  · [facility] -stable-  · Hypertension-continue current medical management  · Nursing still looking into the patient and will locate an  for him to talk to    DC planning/Dispo: TBD but  Home eventually after IR plans. DVT ppx: Heparin    Signed:  Pj Bartlett MD       This note was created with the help of Dragon voice recognition software.   Although the note was reviewed for errors and corrected were noted. Of note additional errors and inconsistencies may remain in this note secondary to the use of Dragon voice recognition software.

## 2021-01-18 NOTE — PROGRESS NOTES
Dr. Cervantes at bedside.    Pt. Alert. Complained of chest pain when ambulating. Morphine was given. Heparin rate @ 28.4. 18 units/78.9 kg/hr. Ptt. 94.6. New heparin bag was hung with a.am nurse. No signs of bleeding. Pt. advised to use call light with assistance.

## 2021-01-18 NOTE — PROGRESS NOTES
Chart screened by  for discharge planning. Patient insured, wife at home. Followed by sleep lab for CPAP. No needs identified at this time. Please consult  if any new issues arise.     Discharge Location  Discharge Placement: Home

## 2021-01-18 NOTE — CONSULTS
Department of Interventional Radiology  [phone]        Consult Note     Patient: Sonya Mcgarry. MRN: [id number]  SSN: [id number]    YOB: 1940  Age: 79 y.o. Sex: male      Referring Physician: Maddison Dunlap MD    Consult Date: 2021     Subjective:     Chief Complaint: Shortness of breath    History of Present Illness: Sonya Redmond is a 79 y.o. male who is seen in consultation at the request of Dr. Maddison Dunlap for evaluation of acute pulmonary emboli. Mr. Royer Correa reports sharp stabbing lower right chest pain that began a few days ago. He repots that initially he felt like that the pain was \"from my bad back\" after doing some yard work. The pain is worse with inspiration. He does report a hx of PE \"about 10 years ago\" for which he was on coumadin. Recent trip to [place] at the end of December. Denies FH of DVT/PE.      Past Medical History:   Diagnosis Date    Bladder tumor     see copy of records    GERD (gastroesophageal reflux disease)     Hiatal hernia     Hypertension     Psychiatric disorder     Pulmonary emboli (HCC)     Sleep apnea      Past Surgical History:   Procedure Laterality Date    CARDIAC SURG PROCEDURE UNLIST      2019    HX UROLOGICAL      bladder surgery for tumor      Family History   Problem Relation Age of Onset    Diabetes Father     Heart Disease Father         MI 75 yo   G[employer] Cancer Sister         breast  62s    Diabetes Sister     Cancer Paternal Aunt         5 sisters:  colon     Social History     Tobacco Use    Smoking status: Former Smoker     Packs/day: 1.00     Years: 2.00     Pack years: 2.00     Types: Cigarettes     Quit date: 1964     Years since quittin.1    Smokeless tobacco: Never Used   Substance Use Topics    Alcohol use: Yes     Comment: occasional      Allergies   Allergen Reactions    Erythromycin Hives     Current Facility-Administered Medications   Medication Dose Route Frequency Provider Last Rate Last Admin    aspirin delayed-release tablet 81 mg  81 mg Oral DAILY Claudia RICH MD   81 mg at 01/18/21 0900    pantoprazole (PROTONIX) tablet 40 mg  40 mg Oral ACB&D Claudia RICH MD   40 mg at 01/18/21 0900    polyethylene glycol (MIRALAX) packet 17 g  17 g Oral DAILY Claudia RICH MD   17 g at 01/18/21 0901    sodium chloride (NS) flush 5-40 mL  5-40 mL IntraVENous Q8H Claudia RICH MD   10 mL at 01/18/21 0552    sodium chloride (NS) flush 5-40 mL  5-40 mL IntraVENous PRN Annabel Velásquez MD        morphine injection 2 mg  2 mg IntraVENous Q4H PRN Claudia RICH MD   2 mg at 01/17/21 1925    oxyCODONE-acetaminophen (PERCOCET 7.5) 7.5-325 mg per tablet 1 Tab  1 Tab Oral Q4H PRN Debo Geronimo MD   1 Tab at 01/17/21 2350    heparin 25,000 units in dextrose 500 mL infusion  18-36 Units/kg/hr IntraVENous TITRATE Lenny Chase, DO 28.4 mL/hr at 01/18/21 1234 18 Units/kg/hr at 01/18/21 1234       Medications Prior to Admission   Medication Sig    omeprazole (PRILOSEC) 20 mg capsule Take 1 Cap by mouth two (2) times a day.  polyethylene glycol (MIRALAX) 17 gram/dose powder Take 17 g by mouth daily.  aspirin delayed-release 81 mg tablet Take 81 mg by mouth daily.  multivit-min-FA-lycopen-lutein (CENTRUM SILVER) 0.4-300-250 mg-mcg-mcg tab Take  by mouth.  cyanocobalamin (VITAMIN B-12) 1,000 mcg tablet Take 1,000 mcg by mouth daily.  acetaminophen (TYLENOL EXTRA STRENGTH) 500 mg tablet Take 1,000 mg by mouth every six (6) hours as needed for Pain. Takes two tabs at bedtime        Allergies   Allergen Reactions    Erythromycin Hives       Review of Systems:  A detailed 10 organ review of systems is obtained with pertinent positives as listed in the History of Present Illness and Past Medical History. All others are negative.     Objective:     Physical Exam:  Vitals:    01/17/21 2353 01/18/21 0508 01/18/21 0742 01/18/21 1133   BP: (!) 147/82 110/72 123/74 126/78   Pulse: 97 83 84 86   Resp: 18 18 20 20   Temp: 99.5 °F (37.5 °C) 97.8 °F (36.6 °C) 98.1 °F (36.7 °C) 99 °F (37.2 °C)   SpO2: 95% 91% 93% 92%   Weight:       Height:            Pain Assessment  Pain Intensity 1: 2 (01/18/21 0758)  Pain Location 1: Chest  Pain Intervention(s) 1: Medication (see MAR)  Patient Stated Pain Goal: 3      HEART: regular rate and rhythm, S1, S2 normal, no murmur, click, rub or gallop  LUNG: clear to auscultation bilaterally, rales R base, L base  ABDOMEN: soft, non-tender. Bowel sounds normal. No masses,  no organomegaly  EXTREMITIES: normal strength, tone, and muscle mass, no deformities, no erythema, induration, or nodules, no evidence of lower extrmeity edema     Lab/Data Review: All lab results for the last 24 hours reviewed. Assessment/Plan:     80 yo male admitted with acute bilateral PE. Serum BNP is normal and serum troponin is borderline elevated. No evidence of right heart strain on CT. Recommend conservative treatment with anticoagulation such as Eliquis at this point given absence of elevated biomarkers and right heart strain. 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 Thank you for this consult. I can be reached through [employer] if there are any additional questions or concerns.      Hospital Problems  Date Reviewed: 6/15/2020          Codes Class Noted POA    Pulmonary embolism ([facility] ICD-10-CM: I26.99  ICD-9-CM: 415.19  1/18/2021 Unknown        * (Principal) Acute pulmonary embolus ([facility] ICD-10-CM: I26.99  ICD-9-CM: 415.19  1/16/2021 Yes        Leukocytosis ICD-10-CM: A56.611  ICD-9-CM: 288.60  1/16/2021 Yes        [facility] (obstructive sleep apnea) ICD-10-CM: G47.33  ICD-9-CM: 327.23  6/25/2019 Yes        Essential hypertension ICD-10-CM: I10  ICD-9-CM: 401.9  2/12/2019 Yes

## 2021-01-19 ENCOUNTER — APPOINTMENT (OUTPATIENT)
Dept: ULTRASOUND IMAGING | Age: 81
DRG: 176 | End: 2021-01-19
Attending: INTERNAL MEDICINE
Payer: MEDICARE

## 2021-01-19 VITALS
WEIGHT: 174 LBS | TEMPERATURE: 97.7 F | RESPIRATION RATE: 20 BRPM | SYSTOLIC BLOOD PRESSURE: 131 MMHG | BODY MASS INDEX: 28.99 KG/M2 | OXYGEN SATURATION: 93 % | HEIGHT: 65 IN | DIASTOLIC BLOOD PRESSURE: 84 MMHG | HEART RATE: 85 BPM

## 2021-01-19 LAB
ANION GAP SERPL CALC-SCNC: 7 MMOL/L (ref 7–16)
BASOPHILS # BLD: 0.1 K/UL (ref 0–0.2)
BASOPHILS NFR BLD: 1 % (ref 0–2)
BUN SERPL-MCNC: 12 MG/DL (ref 8–23)
CALCIUM SERPL-MCNC: 9.3 MG/DL (ref 8.3–10.4)
CHLORIDE SERPL-SCNC: 107 MMOL/L (ref 98–107)
CO2 SERPL-SCNC: 27 MMOL/L (ref 21–32)
CREAT SERPL-MCNC: 1.02 MG/DL (ref 0.8–1.5)
DIFFERENTIAL METHOD BLD: ABNORMAL
EOSINOPHIL # BLD: 0.3 K/UL (ref 0–0.8)
EOSINOPHIL NFR BLD: 4 % (ref 0.5–7.8)
ERYTHROCYTE [DISTWIDTH] IN BLOOD BY AUTOMATED COUNT: 13.3 % (ref 11.9–14.6)
GLUCOSE SERPL-MCNC: 98 MG/DL (ref 65–100)
HCT VFR BLD AUTO: 37.7 % (ref 41.1–50.3)
HGB BLD-MCNC: 12.6 G/DL (ref 13.6–17.2)
IMM GRANULOCYTES # BLD AUTO: 0 K/UL (ref 0–0.5)
IMM GRANULOCYTES NFR BLD AUTO: 1 % (ref 0–5)
LYMPHOCYTES # BLD: 2 K/UL (ref 0.5–4.6)
LYMPHOCYTES NFR BLD: 22 % (ref 13–44)
MCH RBC QN AUTO: 28.3 PG (ref 26.1–32.9)
MCHC RBC AUTO-ENTMCNC: 33.4 G/DL (ref 31.4–35)
MCV RBC AUTO: 84.7 FL (ref 79.6–97.8)
MONOCYTES # BLD: 0.8 K/UL (ref 0.1–1.3)
MONOCYTES NFR BLD: 10 % (ref 4–12)
NEUTS SEG # BLD: 5.7 K/UL (ref 1.7–8.2)
NEUTS SEG NFR BLD: 64 % (ref 43–78)
NRBC # BLD: 0 K/UL (ref 0–0.2)
PLATELET # BLD AUTO: 269 K/UL (ref 150–450)
PMV BLD AUTO: 9.4 FL (ref 9.4–12.3)
POTASSIUM SERPL-SCNC: 3.7 MMOL/L (ref 3.5–5.1)
RBC # BLD AUTO: 4.45 M/UL (ref 4.23–5.6)
SODIUM SERPL-SCNC: 141 MMOL/L (ref 136–145)
WBC # BLD AUTO: 8.9 K/UL (ref 4.3–11.1)

## 2021-01-19 PROCEDURE — 93970 EXTREMITY STUDY: CPT

## 2021-01-19 PROCEDURE — 74011250637 HC RX REV CODE- 250/637: Performed by: INTERNAL MEDICINE

## 2021-01-19 PROCEDURE — 2709999900 HC NON-CHARGEABLE SUPPLY

## 2021-01-19 PROCEDURE — 74011250637 HC RX REV CODE- 250/637: Performed by: FAMILY MEDICINE

## 2021-01-19 PROCEDURE — 80048 BASIC METABOLIC PNL TOTAL CA: CPT

## 2021-01-19 PROCEDURE — 85025 COMPLETE CBC W/AUTO DIFF WBC: CPT

## 2021-01-19 PROCEDURE — 36415 COLL VENOUS BLD VENIPUNCTURE: CPT

## 2021-01-19 RX ORDER — OXYCODONE AND ACETAMINOPHEN 7.5; 325 MG/1; MG/1
1 TABLET ORAL
Qty: 12 TAB | Refills: 0 | Status: SHIPPED | OUTPATIENT
Start: 2021-01-19 | End: 2021-01-22

## 2021-01-19 RX ADMIN — PANTOPRAZOLE SODIUM 40 MG: 40 TABLET, DELAYED RELEASE ORAL at 06:35

## 2021-01-19 RX ADMIN — Medication 5 ML: at 06:01

## 2021-01-19 RX ADMIN — APIXABAN 10 MG: 5 TABLET, FILM COATED ORAL at 08:07

## 2021-01-19 RX ADMIN — ASPIRIN 81 MG: 81 TABLET ORAL at 08:07

## 2021-01-19 NOTE — DISCHARGE SUMMARY
Hospitalist Discharge Summary     Admit Date:  2021  9:40 PM   Name:  Ariane Blanco. Age:  79 y.o.  :  1940   MRN:  [id number]   PCP:  Adelia Whittington MD  Treatment Team: Attending Provider: Philip Thomason MD; Utilization Review: Myesha Monk; Care Manager: Yue Mancia RN; Charge Nurse: Brandon Brown    Problem List for this Hospitalization:  Hospital Problems as of 2021 Date Reviewed: 6/15/2020          Codes Class Noted - Resolved POA    Pulmonary embolism ([facility] ICD-10-CM: I26.99  ICD-9-CM: 415.19  2021 - Present Unknown        * (Principal) Acute pulmonary embolus ([facility] ICD-10-CM: I26.99  ICD-9-CM: 415.19  2021 - Present Yes        Leukocytosis ICD-10-CM: S02.579  ICD-9-CM: 288.60  2021 - Present Yes        [facility] (obstructive sleep apnea) ICD-10-CM: G47.33  ICD-9-CM: 327.23  2019 - Present Yes        Essential hypertension ICD-10-CM: I10  ICD-9-CM: 401.9  2019 - Present Yes                Admission HPI from 2021:    \"CHIEF COMPLAINT: SOB, R chest wall pain     HISTORY OF PRESENT ILLNESS:   Ariane Carreon is an 79 y.o. male with a past medical history of HTN, [facility], previous PE about 8-10 years ago who presents to the ER with report of sharp stabbing R lower chest wall pain that began a few days ago and got worse today. He was out working in his yard and had a stabbing pain in his ribs, leading him to think he had pulled a muscle. However, the pain did not get better with Tylenol or Ibuprofen. He reports some improvement in the pain now as he lies still. Does admit to worse pain when he takes a deep breath. Also admits to feeling some mild shortness of breath for the past several days. Returned from a trip to [place] at the end of December but denies any other long trips or periods of inactivity. Denies any fevers, chills, coughs . \"    Hospital Course:  Mr. Nina Loera was admitted and started on a heparin drip.   Echocardiogram was within normal limits. He was seen by IR but was not a candidate for any intervention. He has been transitioned to Eliquis chest wall pain has improved with p.o. pain meds. Doppler studies were done and showed right calf vein thrombus otherwise no other evidence of DVTs. He had one episode of fever but has since been afebrile for 24 hours. His hospital course was complicated by the loss of his keys-which were eventually found. He is stable for discharge to home. Follow up instructions below. Plan was discussed with the patient and IDT. All questions answered. Patient was stable at time of discharge and was instructed to call or return if there are any concerns or recurrence of symptoms. Diagnostic Imaging/Tests:   Xr Chest Pa Lat    Result Date: 1/16/2021  TWO-VIEW CHEST: CLINICAL HISTORY: Right-sided chest pain for 3 days. Now with mild leukocytosis. COMPARISON:  None. FINDINGS: PA and lateral chest images demonstrate no acute confluent infiltrate or significant pleural fluid collection. However, there is atelectasis/infiltrate at the right lung base as well as subtle patchy infiltrates scattered elsewhere bilaterally. The heart size is within normal limits without evidence of congestive heart failure or pneumothorax. The bony thorax appears intact on these views. IMPRESSION:  RIGHT BASILAR ATELECTASIS/INFILTRATE WITH SUBTLE PATCHY INFILTRATE ELSEWHERE BILATERALLY. Ct Chest W Cont    Result Date: 1/16/2021  CHEST CT ANGIOGRAPHY WITH ADDITIONAL REFORMATS:  CLINICAL HISTORY:  Right chest pain for 3 days. Now with mild leukocytosis TECHNIQUE:  During bolus injection of nonionic intravenous contrast, the chest was scanned with spiral technique, and coronal reformats were produced. Radiation dose reduction was achieved using one or all of the following techniques: automated exposure control, weight-based dosing, iterative reconstruction. COMPARISON:  Radiographs today.  FINDINGS: Multifocal intraluminal soft tissue density consistent with acute pulmonary embolism is present in both lower lobes, the right middle lobe, in the right upper lobe. There is associated scattered atelectasis/infiltrate, most marked in the right lower lobe in association with the largest clot burden. There is no definite pneumothorax. No pathologically enlarged lymph nodes or abnormal fluid collection is seen. The epigastrium appears unremarkable as imaged. IMPRESSION:  EXTENSIVE BILATERAL PULMONARY EMBOLISM WITH SPARING OF THE LEFT UPPER LOBE. Preliminary results were reported to Dr. Darylene Speedy in the ER on 2021 at 1025 hours by myself via telephone. [phone] Critical results were communicated as outlined in Section II.C.2.a.i of the ACR Practice Guideline for Communication of Diagnostic Imaging Findings. Echocardiogram results:  Results for orders placed or performed during the hospital encounter of 21   2D ECHO COMPLETE ADULT (TTE) [address]  [address] [address] [address] [facility]  [phone]    Transthoracic Echocardiogram  2D, M-mode, Doppler, and Color Doppler    Patient: Eris Dutton  MR #: [id number]  : [id number]  Age: 79 years  Gender: Male  Study date: 2021  Account #: [de-identified]  Height: 65 in  Weight: 173.6 lb  BSA: 1.86 mï¾²  Status:Routine  Location: 720  BP: 166/ 88    Allergies: ERYTHROMYCIN    Sonographer:  AYAAN Mcclellan  Group:  [facility]  Referring Physician:  Lebron Rios MD  Reading Physician:  Linda Duque. Jermaine Michael MD [facility] [place]    INDICATIONS: PE    PROCEDURE: This was a routine study. A transthoracic echocardiogram was  performed. The study included complete 2D imaging, M-mode, complete spectral  Doppler, and color Doppler. Intravenous contrast (agitated saline) was  administered. Image quality was adequate.     LEFT VENTRICLE: Size was normal. Systolic function was normal. Ejection  fraction was estimated in the range of 55 % to 60 %. There were no regional  wall motion abnormalities. There was mild concentric hypertrophy. Left  ventricular diastolic function parameters were normal. Avg E/e': 9.6. RIGHT VENTRICLE: The size was normal. Systolic function was normal. The  tricuspid jet envelope definition was inadequate for estimation of RV   systolic  pressure. LEFT ATRIUM: Size was normal.    ATRIAL SEPTUM: Agitated saline contrast injection (bubble study) was   performed. There was no right-to-left shunt, at rest or induced by the Valsalva   maneuver. RIGHT ATRIUM: Size was normal.    SYSTEMIC VEINS: IVC: The inferior vena cava was normal in size and course. AORTIC VALVE: The valve was trileaflet. Leaflets exhibited mild sclerosis. There was no evidence for stenosis. There was mild regurgitation. MITRAL VALVE: Valve structure was normal. There was no evidence for stenosis. There was mild regurgitation. TRICUSPID VALVE: The valve structure was normal. There was no evidence for  stenosis. There was trivial regurgitation. PULMONIC VALVE: Not well visualized. There was no evidence for stenosis. There  was no insufficiency. PERICARDIUM: There was no pericardial effusion. AORTA: The root exhibited normal size. SUMMARY:    -  Left ventricle: Systolic function was normal. Ejection fraction was  estimated in the range of 55 % to 60 %. There were no regional wall motion  abnormalities. There was mild concentric hypertrophy. -  Atrial septum: Agitated saline contrast injection (bubble study) was  performed. There was no right-to-left shunt, at rest or induced by the   Valsalva  maneuver. -  Mitral valve: There was mild regurgitation.     SYSTEM MEASUREMENT TABLES    2D  Ao Diam: 3.2 cm  LA Diam: 4.2 cm  LAEDV Index (A-L): 21.6 ml/m2  %FS: 43.6 %  IVSd: 1.5 cm  LVIDd: 4.2 cm  LVIDs: 2.4 cm  LVOT Diam: 2.1 cm  LVPWd: 1.2 cm    PW  E/E' Av.6  E/E' Lat: 7.9  E/E' Sept: 12.2    Prepared and signed by    Mauricio Marsh MD [facility] [place]  Signed 17-Jan-2021 13:26:15           All Micro Results     None          Labs: Results:       BMP, Mg, Phos Recent Labs     01/19/21 0417 01/18/21  0357 01/17/21  0500    139 140   K 3.7 3.5 3.6    108* 109*   CO2 27 27 25   AGAP 7 4* 6*   BUN 12 12 12   CREA 1.02 1.07 0.91   CA 9.3 8.9 9.0   GLU 98 137* 97      CBC Recent Labs     01/19/21 0417 01/18/21  0357 01/17/21  0500   WBC 8.9 11.8* 10.3   RBC 4.45 4.52 4.50   HGB 12.6* 12.9* 13.0*   HCT 37.7* 38.3* 38.3*    255 227   GRANS 64 70 69   LYMPH 22 18 20   EOS 4 1 1   MONOS 10 10 9   BASOS 1 0 1   IG 1 1 1   ANEU 5.7 8.3* 7.1   ABL 2.0 2.2 2.1   EVERARDO 0.3 0.1 0.1   ABM 0.8 1.2 0.9   ABB 0.1 0.1 0.1   AIG 0.0 0.1 0.1      LFT Recent Labs     01/16/21 2024   ALT 38      TP 8.0   ALB 3.4   GLOB 4.6*   AGRAT 0.7*      Cardiac Testing No results found for: BNPP, BNP, CPK, RCK1, RCK2, RCK3, RCK4, CKMB, CKNDX, CKND1, TROPT, TROIQ   Coagulation Tests Lab Results   Component Value Date/Time    Prothrombin time 13.9 07/11/2019 12:23 PM    INR 1.1 07/11/2019 12:23 PM    aPTT 94.6 (H) 01/17/2021 05:00 AM    aPTT 31.8 01/16/2021 08:24 PM      A1c No results found for: HBA1C, HGBE8, YCC1YFID   Lipid Panel Lab Results   Component Value Date/Time    Cholesterol, total 212 (H) 05/02/2019 08:26 AM    HDL Cholesterol 33 (L) 05/02/2019 08:26 AM    LDL, calculated 140 (H) 05/02/2019 08:26 AM    VLDL, calculated 39 05/02/2019 08:26 AM    Triglyceride 197 (H) 05/02/2019 08:26 AM      Thyroid Panel Lab Results   Component Value Date/Time    TSH 2.150 01/28/2019 12:55 PM    TSH 1.650 12/05/2017 02:17 PM        Most Recent UA No results found for: COLOR, APPRN, REFSG, MAULIK, PROTU, GLUCU, KETU, BILU, BLDU, UROU, MAEGAN, LEUKU     Allergies   Allergen Reactions    Erythromycin Hives     Immunization History   Administered Date(s) Administered    Influenza High Dose Vaccine PF 09/20/2018    Influenza Vaccine 11/19/2016, 09/27/2017    Influenza Vaccine (Tri) Adjuvanted (>65 Yrs FLUAD TRI [id number]) 09/13/2019    Pneumococcal Conjugate (PCV-13) 02/25/2016    Pneumococcal Polysaccharide (PPSV-23) 01/30/2006    Td 02/16/2000, 01/30/2006    Tdap 11/13/2015    Zoster Vaccine, Live 01/20/2011       All Labs from Last 24 Hrs:  Recent Results (from the past 24 hour(s))   CBC WITH AUTOMATED DIFF    Collection Time: 01/19/21  4:17 AM   Result Value Ref Range    WBC 8.9 4.3 - 11.1 K/uL    RBC 4.45 4.23 - 5.6 M/uL    HGB 12.6 (L) 13.6 - 17.2 g/dL    HCT 37.7 (L) 41.1 - 50.3 %    MCV 84.7 79.6 - 97.8 FL    MCH 28.3 26.1 - 32.9 PG    MCHC 33.4 31.4 - 35.0 g/dL    RDW 13.3 11.9 - 14.6 %    PLATELET 674 887 - 649 K/uL    MPV 9.4 9.4 - 12.3 FL    ABSOLUTE NRBC 0.00 0.0 - 0.2 K/uL    DF AUTOMATED      NEUTROPHILS 64 43 - 78 %    LYMPHOCYTES 22 13 - 44 %    MONOCYTES 10 4.0 - 12.0 %    EOSINOPHILS 4 0.5 - 7.8 %    BASOPHILS 1 0.0 - 2.0 %    IMMATURE GRANULOCYTES 1 0.0 - 5.0 %    ABS. NEUTROPHILS 5.7 1.7 - 8.2 K/UL    ABS. LYMPHOCYTES 2.0 0.5 - 4.6 K/UL    ABS. MONOCYTES 0.8 0.1 - 1.3 K/UL    ABS. EOSINOPHILS 0.3 0.0 - 0.8 K/UL    ABS. BASOPHILS 0.1 0.0 - 0.2 K/UL    ABS. IMM.  GRANS. 0.0 0.0 - 0.5 K/UL   METABOLIC PANEL, BASIC    Collection Time: 01/19/21  4:17 AM   Result Value Ref Range    Sodium 141 136 - 145 mmol/L    Potassium 3.7 3.5 - 5.1 mmol/L    Chloride 107 98 - 107 mmol/L    CO2 27 21 - 32 mmol/L    Anion gap 7 7 - 16 mmol/L    Glucose 98 65 - 100 mg/dL    BUN 12 8 - 23 MG/DL    Creatinine 1.02 0.8 - 1.5 MG/DL    GFR est AA >60 >60 ml/min/1.73m2    GFR est non-AA >60 >60 ml/min/1.73m2    Calcium 9.3 8.3 - 10.4 MG/DL       Discharge Exam:  Patient Vitals for the past 24 hrs:   Temp Pulse Resp BP SpO2   01/19/21 0424 97.8 °F (36.6 °C) 80 20 (!) 151/88 93 %   01/19/21 0056 98 °F (36.7 °C) 93 18 132/78 92 %   01/18/21 2026 99.3 °F (37.4 °C) 98 20 (!) 143/83 91 %   01/18/21 1459 98 °F (36.7 °C) 86 18 132/67 92 %   01/18/21 1133 99 °F (37.2 °C) 86 20 126/78 92 %   01/18/21 0742 98.1 °F (36.7 °C) 84 20 123/74 93 %     Oxygen Therapy  O2 Sat (%): 93 % (01/19/21 0424)  Pulse via Oximetry: 92 beats per minute (01/16/21 2159)  O2 Device: Room air (01/16/21 2017)  No intake or output data in the 24 hours ending 01/19/21 0733    General:    Well nourished. Alert. No distress. Eyes:   Normal sclera. Extraocular movements intact. ENT:  Normocephalic, atraumatic. Moist mucous membranes  CV:   Regular rate and rhythm. No murmur, rub, or gallop. Lungs:  Clear to auscultation bilaterally. No wheezing, rhonchi, or rales. Abdomen: Soft, nontender, nondistended. Bowel sounds normal.   Extremities: Warm and dry. No cyanosis or edema. Neurologic: CN II-XII grossly intact. Sensation intact. Skin:     No rashes or jaundice. Psych:  Normal mood and affect. Discharge Info:   Current Discharge Medication List      START taking these medications    Details   oxyCODONE-acetaminophen (PERCOCET 7.5) 7.5-325 mg per tablet Take 1 Tab by mouth every four (4) hours as needed for Pain for up to 3 days. Max Daily Amount: 6 Tabs. Qty: 12 Tab, Refills: 0    Associated Diagnoses: Other acute pulmonary embolism without acute cor pulmonale (HCC)      apixaban (ELIQUIS) 5 mg tablet 2 tabs po bid starting at 9pm today until 9am on  01/25/2021 then 1 tab pod bid starting at 9pm on 01/25/2021 then 1 tab po bid  Indications: blood clot in a deep vein of the extremities, a clot in the lung  Qty: 96 Tab, Refills: 0         CONTINUE these medications which have NOT CHANGED    Details   omeprazole (PRILOSEC) 20 mg capsule Take 1 Cap by mouth two (2) times a day. Qty: 180 Cap, Refills: 1    Associated Diagnoses: Gastroesophageal reflux disease without esophagitis      polyethylene glycol (MIRALAX) 17 gram/dose powder Take 17 g by mouth daily. aspirin delayed-release 81 mg tablet Take 81 mg by mouth daily.       multivit-min-FA-lycopen-lutein (CENTRUM SILVER) 0.4-300-250 mg-mcg-mcg tab Take  by mouth.      cyanocobalamin (VITAMIN B-12) 1,000 mcg tablet Take 1,000 mcg by mouth daily. acetaminophen (TYLENOL EXTRA STRENGTH) 500 mg tablet Take 1,000 mg by mouth every six (6) hours as needed for Pain. Takes two tabs at bedtime               Disposition: Home  Activity: As tolerated  Diet: DIET REGULAR   Follow-up with your primary care physician for cancer screening. This was discussed with the patient his wife; although the patient is 79 she says his grandmother lived to be over 100 so she thinks he has a high life expectancy. His wife wants to be seen by pulmonologist for the PE so I will refer him to [facility] pulmonology for outpatient follow-up. Follow-up Information     Follow up With Specialties Details Why Contact Info    Erika Stock MD Family Medicine In 1 week  [address] [address]  [phone]      [facility]  In 2 weeks. pulmonary embolism  [address]  [place] [address] [facility]  [phone]          Time spent in patient discharge planning and coordination 35 minutes.     Signed:  Eneida Wilson MD

## 2021-01-19 NOTE — DISCHARGE INSTRUCTIONS
Patient Education        Pulmonary Embolism: Care Instructions  Your Care Instructions     Pulmonary embolism is the sudden blockage of an artery in the lung. Blood clots in the deep veins of the leg or pelvis (deep vein thrombosis, or DVT) are the most common cause. These blood clots can travel to the lungs. Pulmonary embolism can be very serious. Because you have had one pulmonary embolism, you are at greater risk for having another one. But you can take steps to prevent another pulmonary embolism by following your doctor's instructions. You will probably take a prescription blood-thinning medicine to prevent blood clots. A blood thinner can stop a blood clot from growing larger and prevent new clots from forming. Follow-up care is a key part of your treatment and safety. Be sure to make and go to all appointments, and call your doctor if you are having problems. It's also a good idea to know your test results and keep a list of the medicines you take. How can you care for yourself at home? · Take your medicines exactly as prescribed. Call your doctor if you think you are having a problem with your medicine. You will get more details on the specific medicines your doctor prescribes. · If you are taking a blood thinner, be sure you get instructions about how to take your medicine safely. Blood thinners can cause serious bleeding problems. Preventing future pulmonary embolisms  · Exercise. Keep blood moving in your legs to keep clots from forming. If you are traveling by car, stop every hour or so. Get out and walk around for a few minutes. If you are traveling by bus, train, or plane, get out of your seat and walk up and down the aisles every hour or so. You also can do leg exercises while you are seated. Pump your feet up and down by pulling your toes up toward your knees then pointing them down. · Get up out of bed as soon as possible after an illness or surgery. · Do not smoke.  If you need help quitting, talk to your doctor about stop-smoking programs and medicines. These can increase your chances of quitting for good. · Check with your doctor before taking hormone or birth control pills. These may increase your risk of blood clots. · Ask your doctor about wearing compression stockings to help prevent blood clots in your legs. There are different types of stockings, and they need to fit right. So your doctor will recommend what you need. When should you call for help? Call 911 anytime you think you may need emergency care. For example, call if:    · You have shortness of breath.     · You have chest pain.     · You passed out (lost consciousness).     · You cough up blood. Call your doctor now or seek immediate medical care if:    · You have new or worsening pain or swelling in your leg. Watch closely for changes in your health, and be sure to contact your doctor if:    · You do not get better as expected. Where can you learn more? Go to http://www.gray.com/  Enter U568 in the search box to learn more about \"Pulmonary Embolism: Care Instructions. \"  Current as of: March 4, 2020               Content Version: 12.6  © 6218-2816 "[employer]. Care instructions adapted under license by [employer] (which disclaims liability or warranty for this information). If you have questions about a medical condition or this instruction, always ask your healthcare professional. [address] any warranty or liability for your use of this information.

## 2021-01-19 NOTE — PROGRESS NOTES
PT. ALERT, RESPONSIVE, AND ORIENTED AND DENIES HAVING ANY PAIN, DISTRESS, OR DIFFICULTY BREATHING AT THIS TIME. VOIDING URINE WITHOUT DIFFICULTY AND ABLE TO TRANSFER WITH [facility] WITHOUT DIFFICULTY. ZERO TEMP AT THIS TIME.

## 2021-05-13 ENCOUNTER — HOSPITAL ENCOUNTER (OUTPATIENT)
Dept: ULTRASOUND IMAGING | Age: 81
Discharge: HOME | End: 2021-05-13
Attending: INTERNAL MEDICINE
Payer: MEDICARE

## 2021-05-13 DIAGNOSIS — I82.401 DEEP VEIN THROMBOSIS (DVT) OF RIGHT LOWER EXTREMITY, UNSPECIFIED CHRONICITY, UNSPECIFIED VEIN (HCC): ICD-10-CM

## 2021-05-13 PROCEDURE — 93971 EXTREMITY STUDY: CPT

## 2021-07-07 PROBLEM — M19.071 ARTHRITIS OF BOTH ANKLES: Status: ACTIVE | Noted: 2021-07-07

## 2021-07-07 PROBLEM — M19.072 ARTHRITIS OF BOTH ANKLES: Status: ACTIVE | Noted: 2021-07-07

## 2021-10-07 PROBLEM — S39.011A ABDOMINAL MUSCLE STRAIN, INITIAL ENCOUNTER: Status: ACTIVE | Noted: 2021-10-07

## 2021-12-27 ENCOUNTER — HOSPITAL ENCOUNTER (OUTPATIENT)
Dept: CT IMAGING | Age: 81
Discharge: HOME | End: 2021-12-27
Attending: FAMILY MEDICINE
Payer: MEDICARE
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
## 2021-12-27 DIAGNOSIS — R41.3 MEMORY LOSS: ICD-10-CM

## 2021-12-27 PROCEDURE — 70450 CT HEAD/BRAIN W/O DYE: CPT

## 2022-01-18 PROBLEM — G31.84 MILD COGNITIVE IMPAIRMENT WITH MEMORY LOSS: Status: ACTIVE | Noted: 2022-01-18

## 2022-03-18 PROBLEM — M19.071 ARTHRITIS OF BOTH ANKLES: Status: ACTIVE | Noted: 2021-07-07

## 2022-03-18 PROBLEM — M19.072 ARTHRITIS OF BOTH ANKLES: Status: ACTIVE | Noted: 2021-07-07

## 2022-03-19 PROBLEM — I26.99 PULMONARY EMBOLISM (HCC): Status: ACTIVE | Noted: 2021-01-18

## 2022-03-19 PROBLEM — D72.829 LEUKOCYTOSIS: Status: ACTIVE | Noted: 2021-01-16

## 2022-03-19 PROBLEM — M48.061 SPINAL STENOSIS OF LUMBAR REGION: Status: ACTIVE | Noted: 2018-08-21

## 2022-03-19 PROBLEM — I26.99 ACUTE PULMONARY EMBOLUS (HCC): Status: ACTIVE | Noted: 2021-01-16

## 2022-03-19 PROBLEM — Z78.9 DIFFICULTY WITH CPAP FULL FACE MASK USE: Status: ACTIVE | Noted: 2021-01-12

## 2022-03-19 PROBLEM — S39.011A ABDOMINAL MUSCLE STRAIN, INITIAL ENCOUNTER: Status: ACTIVE | Noted: 2021-10-07

## 2022-03-19 PROBLEM — G31.84 MILD COGNITIVE IMPAIRMENT WITH MEMORY LOSS: Status: ACTIVE | Noted: 2022-01-18

## 2022-03-19 PROBLEM — K59.09 OTHER CONSTIPATION: Status: ACTIVE | Noted: 2019-09-17

## 2022-03-19 PROBLEM — G47.33 OSA (OBSTRUCTIVE SLEEP APNEA): Status: ACTIVE | Noted: 2019-06-25

## 2022-03-20 PROBLEM — I10 ESSENTIAL HYPERTENSION: Status: ACTIVE | Noted: 2019-02-12

## 2022-04-02 ENCOUNTER — HOSPITAL ENCOUNTER (OUTPATIENT)
Dept: ULTRASOUND IMAGING | Age: 82
Discharge: HOME | End: 2022-04-02
Attending: UROLOGY
Payer: MEDICARE

## 2022-04-02 DIAGNOSIS — R31.0 GROSS HEMATURIA: ICD-10-CM

## 2022-04-02 PROCEDURE — 76770 US EXAM ABDO BACK WALL COMP: CPT

## 2022-04-29 PROBLEM — D22.9 ATYPICAL NEVI: Status: ACTIVE | Noted: 2022-04-29

## 2022-04-29 PROBLEM — Z00.00 MEDICARE ANNUAL WELLNESS VISIT, SUBSEQUENT: Status: ACTIVE | Noted: 2022-04-29

## 2022-05-29 PROBLEM — Z00.00 MEDICARE ANNUAL WELLNESS VISIT, SUBSEQUENT: Status: RESOLVED | Noted: 2022-04-29 | Resolved: 2022-05-29

## 2022-07-18 NOTE — PROGRESS NOTES
Mr. Delsa Sandifer was on the schedule today for a follow-up visit. However, he really just needs a refill of tramadol. He will follow-up as needed.

## 2022-07-22 ENCOUNTER — TELEMEDICINE (OUTPATIENT)
Dept: ORTHOPEDIC SURGERY | Age: 82
End: 2022-07-22

## 2022-07-22 DIAGNOSIS — M47.816 LUMBAR SPONDYLOSIS: Primary | ICD-10-CM

## 2022-07-22 RX ORDER — TRAMADOL HYDROCHLORIDE 50 MG/1
50 TABLET ORAL 3 TIMES DAILY PRN
Qty: 45 TABLET | Refills: 1 | Status: SHIPPED | OUTPATIENT
Start: 2022-07-22 | End: 2022-08-21

## 2022-07-22 NOTE — LETTER
Eileen Holly.  1940  ______________________________________________________________________    Radiographic Studies:    Cervical MRI/ Contrast        Thoracic MRI/ Contrast        Lumbar MRI/ Contrast    CT Myelogram __________________________________________________    NCS/EMG______________________________________________________    MRI of ________________________________________________________    Other__________________________________________________________      Injections:    _______________________________________________________________    Authorization to stop blood thinners________________________________      Medications:    Oral steroids___________________    Muscle Relaxers___________________    Pain medications_____________________    NSAIDS_____________________    Neuropathic pain medication_________________________________________      Physical Therapy:    Lumbar     Thoracic     Cervical       Other_______________________________      Follow up/ Referral:    Pain referral_______________________________________________________    Referral___________________________________________________________    Follow up_________________________________________________________    Handicapped Parking_______________________________________________    Other_____________________________________________________________

## 2022-08-29 NOTE — PROGRESS NOTES
[address] [place] [address] Court, [address]  [phone]    Patient Name:  Sergei Hagen. YOB: 1940      Office Visit 8/31/2022    CHIEF COMPLAINT:    Chief Complaint   Patient presents with    Sleep Apnea    Follow-up         HISTORY OF PRESENT ILLNESS:  Patient is a 80 yo  male seen today for follow up of sleep apnea. Originally diagnosed with MICHAEL in 2019; AHI 32.9 and lowest desaturation 83%. He is prescribed cpap therapy with a humidifier set at 5-13cm with a full face mask. Most recent download reveals AHI on PAP therapy is 5.4, leak is 21 and the hourly usage is 6 hours 0 minutes nightly. The overall use is 2134 hours with days greater than four hours at 329/365. The patient is compliant with the Pap therapy and is feeling better as a result. Patient states he does not feel rested in the morning, does have some daytime fatigue and is napping daily. He is falling asleep easily but awakening every 2-3 hours due to nocturia. Current Moro score is 6/24. He states he hates the CPAP and has been trying to get used to it since he started but has had difficulty. He does not like the mask and he has tried several different masks. He is asked about the inspire. We did discuss and all questions were answered about the inspire. He is agreeable to proceeding with HST and then referral to [facility]. He denies any changes to his medical history. Since last visit he is down 20 pounds.               Past Medical History:   Diagnosis Date    Arthritis of both ankles 7/7/2021    Bladder tumor 2010    see copy of records    GERD (gastroesophageal reflux disease)     Hiatal hernia     Hypertension     Psychiatric disorder     Pulmonary emboli ([facility] [address]     Sleep apnea          Patient Active Problem List   Diagnosis    Arthritis of both ankles    Leukocytosis    Other constipation    Spinal stenosis of lumbar region    Mild cognitive impairment with memory loss    Difficulty with CPAP full face mask use    MICHAEL (obstructive sleep apnea)    Abdominal muscle strain, initial encounter    Pulmonary embolism (HCC)    Acute pulmonary embolus (HCC)    Essential hypertension    Atypical nevi          Past Surgical History:   Procedure Laterality Date    [place] CARDIAC SURG PROCEDURE UNLIST      2019    UROLOGICAL SURGERY      bladder surgery for tumor           Social History     Socioeconomic History    Marital status:      Spouse name: Not on file    Number of children: Not on file    Years of education: Not on file    Highest education level: Not on file   Occupational History    Not on file   Tobacco Use    Smoking status: Former     Packs/day: 1.00     Types: Cigarettes     Quit date: 1964     Years since quittin.7    Smokeless tobacco: Never   Substance and Sexual Activity    Alcohol use: Yes    Drug use: No    Sexual activity: Not on file   Other Topics Concern    Not on file   Social History Narrative    Not on file     Social Determinants of Health     Financial Resource Strain: Not on file   Food Insecurity: Not on file   Transportation Needs: Not on file   Physical Activity: Not on file   Stress: Not on file   Social Connections: Not on file   Intimate Partner Violence: Not on file   Housing Stability: Not on file         Family History   Problem Relation Age of Onset    Cancer Paternal Aunt         9 sisters:  colon    Diabetes Sister     Cancer Sister         breast  62s    Diabetes Father     Heart Disease Father         MI 75 yo         Allergies   Allergen Reactions    Erythromycin Hives         Current Outpatient Medications   Medication Sig    amLODIPine (NORVASC) 2.5 MG tablet Take 2.5 mg by mouth daily    apixaban (ELIQUIS) 2.5 MG TABS tablet Take 2.5 mg by mouth 2 times daily    docusate (COLACE, DULCOLAX) 100 MG CAPS Take 100 mg by mouth 2 times daily    omeprazole (PRILOSEC) 20 MG delayed release capsule Take 20 mg by mouth 2 times daily    aspirin 81 MG EC tablet Take 81 mg by mouth daily (Patient not taking: Reported on 8/31/2022)     No current facility-administered medications for this visit. REVIEW OF SYSTEMS:   CONSTITUTIONAL: Difficulty tolerating CPAP   There is no history of fever, chills, night sweats, weight loss, weight gain, persistent fatigue, or lethargy/hypersomnolence. CARDIAC:   No chest pain, pressure, discomfort, palpitations, orthopnea, murmurs, or edema. GI:   No dysphagia, heartburn reflux, nausea/vomiting, diarrhea, abdominal pain, or bleeding. NEURO:   There is no history of AMS, persistent headache, decreased level of consciousness, seizures, or motor or sensory deficits. PHYSICAL EXAM:    Vitals:    08/31/22 1245   BP: 136/78   Pulse: 58   Resp: 14   Temp: 96.9 °F (36.1 °C)   SpO2: 98%   Weight: 162 lb 1.6 oz (73.5 kg)   Height: 5' 6\" (1.676 m)         GENERAL APPEARANCE:   The patient is normal weight and in no respiratory distress. HEENT:   PERRL. Conjunctivae unremarkable. Nasal mucosa is without epistaxis, exudate, or polyps. Gums and dentition are unremarkable. There is oropharyngeal narrowing. TMs are clear. NECK/LYMPHATIC:   Symmetrical with no elevation of jugular venous pulsation. Trachea midline. No thyroid enlargement. No cervical adenopathy. LUNGS:   Normal respiratory effort with symmetrical lung expansion. Breath sounds clear. HEART:   There is a regular rate and rhythm. No murmur, rub, or gallop. There is no edema in the lower extremities. ABDOMEN:   Soft and non-tender. No hepatosplenomegaly. Bowel sounds are normal.     NEURO:   The patient is alert and oriented to person, place, and time. Memory appears intact and mood is normal.  No gross sensorimotor deficits are present. ASSESSMENT:  (Medical Decision Making)      Diagnosis Orders   1.  MICHAEL (obstructive sleep apnea) AHI is fairly well controlled but patient is having difficulty tolerating. He does not feel rested due to CPAP mask. Will refer for HST and then proceed with [facility] referral for inspire. Ambulatory Referral to Sleep Studies      2. Difficulty with CPAP full face mask use -patient states he has been trying to use CPAP for at least 4 years and has had difficulty. He is interested in inspire device. All questions were answered and we will refer for HST and then to [facility]. Ambulatory Referral to Sleep Studies           PLAN:  Proceed with HST. Patient was reminded to stop CPAP 3 days prior to sleep study. If meets qualifications refer to [facility] for inspire implant. Follow-up will be after implant. Orders Placed This Encounter   Procedures    Ambulatory Referral to Sleep Studies     Referral Priority:   Routine     Referral Type:   Consult for Advice and Opinion     Referral Reason:   Specialty Services Required     Requested Specialty:   Sleep Center     Number of Visits Requested:   1       Collaborating Physician: Dr. Bam Alcaraz    Over 50% of today's office visit was spent in face to face time reviewing test results, prognosis, importance of compliance, education about disease process, benefits of medications, instructions for management of acute flare-ups, and follow up plans. Total face to face time spent with patient was 20 minutes.         MARGIE Kennedy CNP  Electronically signed

## 2022-08-31 ENCOUNTER — OFFICE VISIT (OUTPATIENT)
Dept: SLEEP MEDICINE | Age: 82
End: 2022-08-31
Payer: MEDICARE

## 2022-08-31 VITALS
HEART RATE: 58 BPM | HEIGHT: 66 IN | WEIGHT: 162.1 LBS | TEMPERATURE: 96.9 F | RESPIRATION RATE: 14 BRPM | BODY MASS INDEX: 26.05 KG/M2 | SYSTOLIC BLOOD PRESSURE: 136 MMHG | OXYGEN SATURATION: 98 % | DIASTOLIC BLOOD PRESSURE: 78 MMHG

## 2022-08-31 DIAGNOSIS — Z78.9 DIFFICULTY WITH CPAP FULL FACE MASK USE: ICD-10-CM

## 2022-08-31 DIAGNOSIS — G47.33 OSA (OBSTRUCTIVE SLEEP APNEA): Primary | ICD-10-CM

## 2022-08-31 PROCEDURE — G8427 DOCREV CUR MEDS BY ELIG CLIN: HCPCS | Performed by: STUDENT IN AN ORGANIZED HEALTH CARE EDUCATION/TRAINING PROGRAM

## 2022-08-31 PROCEDURE — G8417 CALC BMI ABV UP PARAM F/U: HCPCS | Performed by: STUDENT IN AN ORGANIZED HEALTH CARE EDUCATION/TRAINING PROGRAM

## 2022-08-31 PROCEDURE — 99213 OFFICE O/P EST LOW 20 MIN: CPT | Performed by: STUDENT IN AN ORGANIZED HEALTH CARE EDUCATION/TRAINING PROGRAM

## 2022-08-31 PROCEDURE — 1036F TOBACCO NON-USER: CPT | Performed by: STUDENT IN AN ORGANIZED HEALTH CARE EDUCATION/TRAINING PROGRAM

## 2022-08-31 PROCEDURE — 1123F ACP DISCUSS/DSCN MKR DOCD: CPT | Performed by: STUDENT IN AN ORGANIZED HEALTH CARE EDUCATION/TRAINING PROGRAM

## 2022-08-31 ASSESSMENT — SLEEP AND FATIGUE QUESTIONNAIRES
HOW LIKELY ARE YOU TO NOD OFF OR FALL ASLEEP IN A CAR, WHILE STOPPED FOR A FEW MINUTES IN TRAFFIC: 0
HOW LIKELY ARE YOU TO NOD OFF OR FALL ASLEEP WHILE SITTING INACTIVE IN A PUBLIC PLACE: 0
HOW LIKELY ARE YOU TO NOD OFF OR FALL ASLEEP WHILE SITTING QUIETLY AFTER LUNCH WITHOUT ALCOHOL: 0
HOW LIKELY ARE YOU TO NOD OFF OR FALL ASLEEP WHILE WATCHING TV: 3
HOW LIKELY ARE YOU TO NOD OFF OR FALL ASLEEP WHILE SITTING AND READING: 3
ESS TOTAL SCORE: 6
HOW LIKELY ARE YOU TO NOD OFF OR FALL ASLEEP WHILE LYING DOWN TO REST IN THE AFTERNOON WHEN CIRCUMSTANCES PERMIT: 0
HOW LIKELY ARE YOU TO NOD OFF OR FALL ASLEEP WHILE SITTING AND TALKING TO SOMEONE: 0
HOW LIKELY ARE YOU TO NOD OFF OR FALL ASLEEP WHEN YOU ARE A PASSENGER IN A CAR FOR AN HOUR WITHOUT A BREAK: 0

## 2022-09-01 ENCOUNTER — TELEPHONE (OUTPATIENT)
Dept: SLEEP MEDICINE | Age: 82
End: 2022-09-01

## 2022-09-01 NOTE — TELEPHONE ENCOUNTER
Patient says that he is waiting on a call from your department for a study ask that the home # be used instead of the mobile

## 2022-09-16 ENCOUNTER — TELEPHONE (OUTPATIENT)
Dept: ORTHOPEDIC SURGERY | Age: 82
End: 2022-09-16

## 2022-09-16 NOTE — TELEPHONE ENCOUNTER
Pt wants to get a virtual or phone visit he  Wants to know if there is any thing else that  Can be done, I tried to do it but need your   Help  for a visit for next friday

## 2022-09-16 NOTE — TELEPHONE ENCOUNTER
Call returned to patient and he states that his right hip is causing increased pain and he does not want to have any type of surgery. He has been taking Tramadol which helps some, he would like to know if he can be offered any more options.

## 2022-09-19 NOTE — TELEPHONE ENCOUNTER
Call returned to patient and he states that he is not having any radiating pain down his legs and would like to try the facet injection. He is taking Tramadol only when needed.

## 2022-09-20 ENCOUNTER — TELEPHONE (OUTPATIENT)
Dept: PULMONOLOGY | Age: 82
End: 2022-09-20

## 2022-09-20 NOTE — TELEPHONE ENCOUNTER
LOV 2/2/22 with Nurys Mauricio-- HTN, GERD, sleep apnea on CPAP, \"massive PE\"    Pt calling to inquire about holding his Eliquis prior to spinal injection next week (9/27). Pt on chronic Eliquis due to a \"massive PE\" about 10 years ago in [place], and another PE and DVTs on 1/16/22 at [address] [place] [address]. At [facility] MD lowered Eliquis to 2.5mg BID. On 5/4/22, pt was told to hold Eliquis for 48hrs prior to and 1 day after his oral surgery. Please advise on holding recommendations for spinal injections if any.

## 2022-09-27 ENCOUNTER — OFFICE VISIT (OUTPATIENT)
Dept: ORTHOPEDIC SURGERY | Age: 82
End: 2022-09-27
Payer: MEDICARE

## 2022-09-27 DIAGNOSIS — M54.16 RADICULOPATHY, LUMBAR REGION: ICD-10-CM

## 2022-09-27 DIAGNOSIS — M47.816 LUMBAR SPONDYLOSIS: Primary | ICD-10-CM

## 2022-09-27 PROCEDURE — 64494 INJ PARAVERT F JNT L/S 2 LEV: CPT | Performed by: PHYSICAL MEDICINE & REHABILITATION

## 2022-09-27 PROCEDURE — 64493 INJ PARAVERT F JNT L/S 1 LEV: CPT | Performed by: PHYSICAL MEDICINE & REHABILITATION

## 2022-09-27 RX ORDER — TRIAMCINOLONE ACETONIDE 40 MG/ML
40 INJECTION, SUSPENSION INTRA-ARTICULAR; INTRAMUSCULAR ONCE
Status: COMPLETED | OUTPATIENT
Start: 2022-09-27 | End: 2022-09-27

## 2022-09-27 RX ADMIN — TRIAMCINOLONE ACETONIDE 40 MG: 40 INJECTION, SUSPENSION INTRA-ARTICULAR; INTRAMUSCULAR at 15:14

## 2022-09-27 NOTE — PROGRESS NOTES
Name: Lianet Narvaez. YOB: 1940  Gender: male  MRN: [id number]    Procedure: Bilateral  L4-L5 and L5-S1 facet joint injections     Precautions: Lianet Narvaez. deniesprior sensitivity to steroid, local anesthetic, iodine, or shellfish. The procedure was discussed at length with her and informed consent was signed and placed in the chart. She was placed in a prone position on the fluoroscopy table and the skin was prepped and draped in a routine sterile fashion. The areas to be injected were each anesthetized with 1% lidocaine. Next, a 25-gauge 3.5 inch spinal needle was carefully advanced under fluoroscopic guidance to the rightL4 - L5 facet joint. Aspiration was negative. Once proper placement was confirmed, 1 ml of 0.25% Marcaine and  10mg kenalog were injected through the spinal needle. The above procedure was then repeated through the rightL5 - S1, and left L4 - L5, and leftL5 - S1 facet joints. Fluoroscopic guidance was used intermittently over a 10-minute period to insure proper needle placement and his safety. A hard copy of the fluoroscopic images has been placed in his chart and is saved on the C-arm hard drive. He was monitored for 30 minutes after the procedure and discharged home in a stable fashion with a routine follow up. Procedural Diagnosis:     ICD-10-CM    1. Lumbar spondylosis  M47.816 FL INJ LUMB/SAC FACET SINGLE LEVEL     triamcinolone acetonide (KENALOG-40) injection 40 mg      2.  Radiculopathy, lumbar region  M54.16 FL INJ LUMB/SAC FACET SINGLE LEVEL     triamcinolone acetonide (KENALOG-40) injection 40 mg           Jessee Mcardle, MD  09/27/22

## 2022-10-26 ENCOUNTER — HOSPITAL ENCOUNTER (OUTPATIENT)
Dept: SLEEP CENTER | Age: 82
Discharge: HOME | End: 2022-10-29
Payer: MEDICARE

## 2022-10-26 PROCEDURE — 95806 SLEEP STUDY UNATT&RESP EFFT: CPT

## 2022-11-03 ENCOUNTER — TELEPHONE (OUTPATIENT)
Dept: SLEEP MEDICINE | Age: 82
End: 2022-11-03

## 2022-11-03 DIAGNOSIS — G47.33 OSA (OBSTRUCTIVE SLEEP APNEA): Primary | ICD-10-CM

## 2022-11-03 NOTE — TELEPHONE ENCOUNTER
Patient had recent sleep study that show moderate sleep apnea. Patient requesting to be referred to [facility] for Clara consult.

## 2022-11-14 ENCOUNTER — OFFICE VISIT (OUTPATIENT)
Dept: FAMILY MEDICINE CLINIC | Facility: CLINIC | Age: 82
End: 2022-11-14
Payer: MEDICARE

## 2022-11-14 VITALS
WEIGHT: 161.9 LBS | HEART RATE: 84 BPM | OXYGEN SATURATION: 98 % | TEMPERATURE: 98.8 F | SYSTOLIC BLOOD PRESSURE: 139 MMHG | HEIGHT: 66 IN | RESPIRATION RATE: 17 BRPM | DIASTOLIC BLOOD PRESSURE: 87 MMHG | BODY MASS INDEX: 26.02 KG/M2

## 2022-11-14 DIAGNOSIS — I10 ESSENTIAL HYPERTENSION: Primary | ICD-10-CM

## 2022-11-14 PROCEDURE — G8484 FLU IMMUNIZE NO ADMIN: HCPCS | Performed by: FAMILY MEDICINE

## 2022-11-14 PROCEDURE — G8417 CALC BMI ABV UP PARAM F/U: HCPCS | Performed by: FAMILY MEDICINE

## 2022-11-14 PROCEDURE — 3078F DIAST BP <80 MM HG: CPT | Performed by: FAMILY MEDICINE

## 2022-11-14 PROCEDURE — 3074F SYST BP LT 130 MM HG: CPT | Performed by: FAMILY MEDICINE

## 2022-11-14 PROCEDURE — 1123F ACP DISCUSS/DSCN MKR DOCD: CPT | Performed by: FAMILY MEDICINE

## 2022-11-14 PROCEDURE — 99214 OFFICE O/P EST MOD 30 MIN: CPT | Performed by: FAMILY MEDICINE

## 2022-11-14 PROCEDURE — G8427 DOCREV CUR MEDS BY ELIG CLIN: HCPCS | Performed by: FAMILY MEDICINE

## 2022-11-14 PROCEDURE — 1036F TOBACCO NON-USER: CPT | Performed by: FAMILY MEDICINE

## 2022-11-14 RX ORDER — AMLODIPINE BESYLATE 2.5 MG/1
2.5 TABLET ORAL DAILY
Qty: 90 TABLET | Refills: 3
Start: 2022-11-14

## 2022-11-14 SDOH — ECONOMIC STABILITY: FOOD INSECURITY: WITHIN THE PAST 12 MONTHS, YOU WORRIED THAT YOUR FOOD WOULD RUN OUT BEFORE YOU GOT MONEY TO BUY MORE.: NEVER TRUE

## 2022-11-14 SDOH — ECONOMIC STABILITY: FOOD INSECURITY: WITHIN THE PAST 12 MONTHS, THE FOOD YOU BOUGHT JUST DIDN'T LAST AND YOU DIDN'T HAVE MONEY TO GET MORE.: NEVER TRUE

## 2022-11-14 ASSESSMENT — PATIENT HEALTH QUESTIONNAIRE - PHQ9
1. LITTLE INTEREST OR PLEASURE IN DOING THINGS: 0
SUM OF ALL RESPONSES TO PHQ QUESTIONS 1-9: 0
2. FEELING DOWN, DEPRESSED OR HOPELESS: 0
SUM OF ALL RESPONSES TO PHQ9 QUESTIONS 1 & 2: 0

## 2022-11-14 ASSESSMENT — ENCOUNTER SYMPTOMS
RESPIRATORY NEGATIVE: 1
GASTROINTESTINAL NEGATIVE: 1

## 2022-11-14 ASSESSMENT — SOCIAL DETERMINANTS OF HEALTH (SDOH): HOW HARD IS IT FOR YOU TO PAY FOR THE VERY BASICS LIKE FOOD, HOUSING, MEDICAL CARE, AND HEATING?: NOT HARD AT ALL

## 2022-11-14 NOTE — PROGRESS NOTES
Keila Simental (:  1940) is a 79 y.o. male,Established patient, here for evaluation of the following chief complaint(s):  Blood Pressure Check (Blood pressure has been running high, patient thought BP machine was wrong, got a new machine and it has been running in 160's-- Amlodipine only takes when BP is 140 or above 3 days in a row.)         ASSESSMENT/PLAN:  1. Essential hypertension  -     amLODIPine (NORVASC) 2.5 MG tablet; Take 1 tablet by mouth daily, Disp-90 tablet, R-3NO PRINT      Plan:  Start taking amlodipine 2.5 mg at night. Check BP several times per week. If above 140/80 regularly then let us know. Otherwise; Return in about 3 months (around 2023) for recheck. Subjective   SUBJECTIVE/OBJECTIVE:  79-year-old male with underlying hypertension, history of pulmonary embolisms, MICHAEL, GERD. He is here because his BP has been high recently. 1)  Hypertesnion:  Has been running high over the last few months. He has seen 140-160 over 70-90 at home. Sen at the [place] last week and his BP was high. Previously was prescribed amlodipine but he never started taking it. He denies chest pain, shortness of breath or headaches. 2)  Pulmonary embolism:  Seen by pulm;  lifetime anticoagulation due to it being a repeat. Compliant with eliquis 2.5 mg BID. Doesn't have any h/o CAD, stents or heart surgery. He also sees the [place], pul, ortho,           Review of Systems   Constitutional:  Negative for activity change, fatigue and fever. Respiratory: Negative. Cardiovascular: Negative. Gastrointestinal: Negative. Neurological:  Negative for headaches. Objective   Physical Exam  Vitals and nursing note reviewed. Constitutional:       General: He is not in acute distress. Appearance: He is not ill-appearing or toxic-appearing. Cardiovascular:      Rate and Rhythm: Normal rate and regular rhythm.    Pulmonary:      Effort: Pulmonary effort is normal. Breath sounds: Normal breath sounds. Neurological:      General: No focal deficit present. Mental Status: He is alert and oriented to person, place, and time. Psychiatric:         Mood and Affect: Mood normal.         Behavior: Behavior normal.         Thought Content: Thought content normal.         Judgment: Judgment normal.        Vitals:    11/14/22 1130   BP: 139/87   Pulse: 84   Resp: 17   Temp: 98.8 °F (37.1 °C)   SpO2: 98%       On this date 11/14/2022 I have spent 30 minutes reviewing previous notes, test results and face to face with the patient discussing the diagnosis and importance of compliance with the treatment plan as well as documenting on the day of the visit. An electronic signature was used to authenticate this note.     --Anju Wheat MD, MD

## 2022-11-29 ENCOUNTER — TELEPHONE (OUTPATIENT)
Dept: ORTHOPEDIC SURGERY | Age: 82
End: 2022-11-29

## 2022-11-29 DIAGNOSIS — M48.061 SPINAL STENOSIS, LUMBAR REGION WITHOUT NEUROGENIC CLAUDICATION: Primary | ICD-10-CM

## 2022-11-29 NOTE — TELEPHONE ENCOUNTER
Patient wants a refill on Tramadol 50 mg 90 day script sent into pharmacy on file. Patient also wants a call when this is sent.

## 2022-11-30 RX ORDER — TRAMADOL HYDROCHLORIDE 50 MG/1
50 TABLET ORAL 3 TIMES DAILY PRN
Qty: 45 TABLET | Refills: 1 | Status: SHIPPED | OUTPATIENT
Start: 2022-11-30 | End: 2022-12-30

## 2022-11-30 NOTE — TELEPHONE ENCOUNTER
Returned call to patient and explained to him that if he wants to get the Tramadol at a 90day supply, then a referral to pain management would be sent. He stated that they are leaving to go out of town and would like to just get a refill and then get a referral to pain management. I explained to him that I would send the prescription request to Dr. Anna Hahn and also put in a referral to [address] [place] [address] and [facility].

## 2022-12-01 ENCOUNTER — TELEPHONE (OUTPATIENT)
Dept: ORTHOPEDIC SURGERY | Age: 82
End: 2022-12-01

## 2022-12-01 DIAGNOSIS — M48.061 SPINAL STENOSIS, LUMBAR REGION WITHOUT NEUROGENIC CLAUDICATION: Primary | ICD-10-CM

## 2022-12-01 DIAGNOSIS — M54.16 RADICULOPATHY, LUMBAR REGION: ICD-10-CM

## 2022-12-01 DIAGNOSIS — M47.816 LUMBAR SPONDYLOSIS: ICD-10-CM

## 2022-12-08 ENCOUNTER — OFFICE VISIT (OUTPATIENT)
Dept: ORTHOPEDIC SURGERY | Age: 82
End: 2022-12-08

## 2022-12-08 DIAGNOSIS — M47.816 LUMBAR SPONDYLOSIS: ICD-10-CM

## 2022-12-08 DIAGNOSIS — M48.061 SPINAL STENOSIS, LUMBAR REGION WITHOUT NEUROGENIC CLAUDICATION: Primary | ICD-10-CM

## 2022-12-08 DIAGNOSIS — M54.16 RADICULOPATHY, LUMBAR REGION: ICD-10-CM

## 2022-12-08 RX ORDER — METHYLPREDNISOLONE ACETATE 80 MG/ML
80 INJECTION, SUSPENSION INTRA-ARTICULAR; INTRALESIONAL; INTRAMUSCULAR; SOFT TISSUE ONCE
Status: COMPLETED | OUTPATIENT
Start: 2022-12-08 | End: 2022-12-08

## 2022-12-08 RX ADMIN — METHYLPREDNISOLONE ACETATE 80 MG: 80 INJECTION, SUSPENSION INTRA-ARTICULAR; INTRALESIONAL; INTRAMUSCULAR; SOFT TISSUE at 09:49

## 2022-12-08 NOTE — PROGRESS NOTES
Name: Toma Martinez. YOB: 1940  Gender: male  MRN: [id number]    Procedure: Bilateral  L4-L5 and L5-S1 facet joint injections     Precautions: Toma Martinez. deniesprior sensitivity to steroid, local anesthetic, iodine, or shellfish. The procedure was discussed at length with her and informed consent was signed and placed in the chart. She was placed in a prone position on the fluoroscopy table and the skin was prepped and draped in a routine sterile fashion. The areas to be injected were each anesthetized with 1% lidocaine. Next, a 25-gauge 3.5 inch spinal needle was carefully advanced under fluoroscopic guidance to the rightL4 - L5 facet joint. Aspiration was negative. Once proper placement was confirmed, 1 ml of 0.25% Marcaine and  20mg depomedrol were injected through the spinal needle. The above procedure was then repeated through the rightL5 - S1, and left L4 - L5, and leftL5 - S1 facet joints. A total of 80 mg of depomedrol was used for today's procedure (20 mg depomedrol was given at each of the 4 injection sites). Fluoroscopic guidance was used intermittently over a 10-minute period to insure proper needle placement and his safety. A hard copy of the fluoroscopic images has been placed in his chart and is saved on the C-arm hard drive. He was monitored for 30 minutes after the procedure and discharged home in a stable fashion with a routine follow up. Procedural Diagnosis:     ICD-10-CM    1. Spinal stenosis, lumbar region without neurogenic claudication  M48.061 FL INJ LUMB/SAC FACET SINGLE LEVEL     XR INJ FACET LUMB SACRAL 2ND LVL     methylPREDNISolone acetate (DEPO-MEDROL) injection 80 mg     External Referral To Pain Medicine      2. Lumbar spondylosis  M47.816 FL INJ LUMB/SAC FACET SINGLE LEVEL     XR INJ FACET LUMB SACRAL 2ND LVL     methylPREDNISolone acetate (DEPO-MEDROL) injection 80 mg     External Referral To Pain Medicine      3. Radiculopathy, lumbar region  M54.16 FL INJ LUMB/SAC FACET SINGLE LEVEL     XR INJ FACET LUMB SACRAL 2ND LVL     methylPREDNISolone acetate (DEPO-MEDROL) injection 80 mg     External Referral To Pain Medicine           Hector Zaldivar MD  12/08/22

## 2022-12-12 ENCOUNTER — TELEPHONE (OUTPATIENT)
Dept: FAMILY MEDICINE CLINIC | Facility: CLINIC | Age: 82
End: 2022-12-12

## 2022-12-12 DIAGNOSIS — I10 ESSENTIAL HYPERTENSION: ICD-10-CM

## 2022-12-12 RX ORDER — AMLODIPINE BESYLATE 5 MG/1
5 TABLET ORAL DAILY
Qty: 90 TABLET | Refills: 3 | Status: SHIPPED | OUTPATIENT
Start: 2022-12-12

## 2022-12-12 NOTE — TELEPHONE ENCOUNTER
Pt left message states that he needs a printed rx for his blood pressure medication \"amlodipine ASAP as he will be leaving to go out of town but would like to shop around for the best price of this medication

## 2022-12-12 NOTE — TELEPHONE ENCOUNTER
Patient called stating that he has been taking Amlodipine 2.5 mg and his BP has been 140's-150's. Patient is going out of town and only has a few pills left. Patient requesting to increase BP medication and have this sent to pharmacy before he goes out of town for three weeks.

## 2023-01-16 ENCOUNTER — TELEPHONE (OUTPATIENT)
Dept: ORTHOPEDIC SURGERY | Age: 83
End: 2023-01-16

## 2023-01-16 NOTE — TELEPHONE ENCOUNTER
They received a referral for this patient but the notes that were attached are a different patient than the cover sheet name that came over. Their fax is [phone]. Please fax the records ASAP. The patient is currently in the office.

## 2023-02-08 LAB
AVERAGE GLUCOSE: NORMAL
HBA1C MFR BLD: 5.2 %

## 2023-02-14 ENCOUNTER — OFFICE VISIT (OUTPATIENT)
Dept: FAMILY MEDICINE CLINIC | Facility: CLINIC | Age: 83
End: 2023-02-14
Payer: MEDICARE

## 2023-02-14 VITALS
HEIGHT: 66 IN | OXYGEN SATURATION: 99 % | HEART RATE: 80 BPM | DIASTOLIC BLOOD PRESSURE: 78 MMHG | SYSTOLIC BLOOD PRESSURE: 120 MMHG | TEMPERATURE: 98.4 F | WEIGHT: 159.4 LBS | BODY MASS INDEX: 25.62 KG/M2 | RESPIRATION RATE: 18 BRPM

## 2023-02-14 DIAGNOSIS — I10 ESSENTIAL HYPERTENSION: Primary | ICD-10-CM

## 2023-02-14 DIAGNOSIS — G47.33 OSA (OBSTRUCTIVE SLEEP APNEA): ICD-10-CM

## 2023-02-14 DIAGNOSIS — K21.9 GASTRO-ESOPHAGEAL REFLUX DISEASE WITHOUT ESOPHAGITIS: ICD-10-CM

## 2023-02-14 PROCEDURE — G8484 FLU IMMUNIZE NO ADMIN: HCPCS | Performed by: FAMILY MEDICINE

## 2023-02-14 PROCEDURE — 1036F TOBACCO NON-USER: CPT | Performed by: FAMILY MEDICINE

## 2023-02-14 PROCEDURE — 3078F DIAST BP <80 MM HG: CPT | Performed by: FAMILY MEDICINE

## 2023-02-14 PROCEDURE — G8417 CALC BMI ABV UP PARAM F/U: HCPCS | Performed by: FAMILY MEDICINE

## 2023-02-14 PROCEDURE — 99214 OFFICE O/P EST MOD 30 MIN: CPT | Performed by: FAMILY MEDICINE

## 2023-02-14 PROCEDURE — 1123F ACP DISCUSS/DSCN MKR DOCD: CPT | Performed by: FAMILY MEDICINE

## 2023-02-14 PROCEDURE — G8427 DOCREV CUR MEDS BY ELIG CLIN: HCPCS | Performed by: FAMILY MEDICINE

## 2023-02-14 PROCEDURE — 3074F SYST BP LT 130 MM HG: CPT | Performed by: FAMILY MEDICINE

## 2023-02-14 RX ORDER — OMEPRAZOLE 20 MG/1
20 CAPSULE, DELAYED RELEASE ORAL 2 TIMES DAILY
Qty: 180 CAPSULE | Refills: 3 | Status: SHIPPED | OUTPATIENT
Start: 2023-02-14

## 2023-02-14 SDOH — ECONOMIC STABILITY: INCOME INSECURITY: HOW HARD IS IT FOR YOU TO PAY FOR THE VERY BASICS LIKE FOOD, HOUSING, MEDICAL CARE, AND HEATING?: NOT HARD AT ALL

## 2023-02-14 SDOH — ECONOMIC STABILITY: FOOD INSECURITY: WITHIN THE PAST 12 MONTHS, THE FOOD YOU BOUGHT JUST DIDN'T LAST AND YOU DIDN'T HAVE MONEY TO GET MORE.: NEVER TRUE

## 2023-02-14 SDOH — ECONOMIC STABILITY: HOUSING INSECURITY
IN THE LAST 12 MONTHS, WAS THERE A TIME WHEN YOU DID NOT HAVE A STEADY PLACE TO SLEEP OR SLEPT IN A SHELTER (INCLUDING NOW)?: NO

## 2023-02-14 SDOH — ECONOMIC STABILITY: FOOD INSECURITY: WITHIN THE PAST 12 MONTHS, YOU WORRIED THAT YOUR FOOD WOULD RUN OUT BEFORE YOU GOT MONEY TO BUY MORE.: NEVER TRUE

## 2023-02-14 ASSESSMENT — PATIENT HEALTH QUESTIONNAIRE - PHQ9
SUM OF ALL RESPONSES TO PHQ9 QUESTIONS 1 & 2: 0
SUM OF ALL RESPONSES TO PHQ QUESTIONS 1-9: 0
SUM OF ALL RESPONSES TO PHQ QUESTIONS 1-9: 0
2. FEELING DOWN, DEPRESSED OR HOPELESS: 0
SUM OF ALL RESPONSES TO PHQ QUESTIONS 1-9: 0
SUM OF ALL RESPONSES TO PHQ QUESTIONS 1-9: 0
1. LITTLE INTEREST OR PLEASURE IN DOING THINGS: 0

## 2023-02-14 ASSESSMENT — ENCOUNTER SYMPTOMS
RESPIRATORY NEGATIVE: 1
GASTROINTESTINAL NEGATIVE: 1

## 2023-02-14 NOTE — PROGRESS NOTES
Ced Cortés (:  1940) is a 79 y.o. male,Established patient, here for evaluation of the following chief complaint(s):  3 Month Follow-Up (Has some BP test from the [place] )         ASSESSMENT/PLAN:  1. Essential hypertension  2. MICHAEL (obstructive sleep apnea)  3. Gastro-esophageal reflux disease without esophagitis  -     omeprazole (PRILOSEC) 20 MG delayed release capsule; Take 1 capsule by mouth 2 times daily, Disp-180 capsule, R-3Normal      Plan:  No change in medication    Get home test for COVID and take if have symptoms. Call office if positive. Return in about 6 months (around 2023) for AWV, wait for new Dr. or give Drs list.         Subjective   SUBJECTIVE/OBJECTIVE:  HPI  69-year-old male with underlying hypertension, history of pulmonary embolisms, MICHAEL, GERD. 1)  Hypertesnion:  Has been better since he started taking amlodipine. He denies chest pain, shortness of breath or headaches. 2)  Pulmonary embolism:  Seen by pulm;  lifetime anticoagulation due to it being a repeat. Compliant with eliquis 2.5 mg BID. Doesn't have any h/o CAD, stents or heart surgery. He also sees the [place], pul, ortho,     Had labs done recently at the [place]. All within nml limits except cholesterol borderline high. Review of Systems   Constitutional: Negative. Respiratory: Negative. Cardiovascular: Negative. Gastrointestinal: Negative. Musculoskeletal: Negative. Skin: Negative. Neurological:  Negative for headaches. Objective   Physical Exam  Vitals and nursing note reviewed. Constitutional:       General: He is not in acute distress. Appearance: He is not ill-appearing or toxic-appearing. HENT:      Head: Normocephalic and atraumatic. Right Ear: Tympanic membrane and ear canal normal.      Left Ear: Tympanic membrane and ear canal normal.   Cardiovascular:      Rate and Rhythm: Normal rate and regular rhythm.    Pulmonary:      Effort: Pulmonary effort is normal.      Breath sounds: Normal breath sounds. Skin:     General: Skin is warm. Neurological:      General: No focal deficit present. Mental Status: He is alert and oriented to person, place, and time. Psychiatric:         Mood and Affect: Mood normal.         Behavior: Behavior normal.         Thought Content: Thought content normal.         Judgment: Judgment normal.        Vitals:    02/14/23 1131   BP: 120/78   Pulse: 80   Resp: 18   Temp: 98.4 °F (36.9 °C)   SpO2: 99%       On this date 2/14/2023 I have spent 30 minutes reviewing previous notes, test results and face to face with the patient discussing the diagnosis and importance of compliance with the treatment plan as well as documenting on the day of the visit. An electronic signature was used to authenticate this note.     --Mike Schreiber MD, MD

## 2023-05-22 ENCOUNTER — TELEPHONE (OUTPATIENT)
Dept: SLEEP MEDICINE | Age: 83
End: 2023-05-22

## 2023-05-22 DIAGNOSIS — Z01.811 PRE-OP CHEST EXAM: Primary | ICD-10-CM

## 2023-05-22 NOTE — TELEPHONE ENCOUNTER
LOV 8/31/22 with Dottie NP, [facility]--- MICHAEL  LOV 2/2/22 with Luke Le in pulmonary-- PE on Eliquis, HTN, GERD, DVTs    Allergies: erythromycin    Pt having Inspire device placed by Dr Piedad Torres on 6/6/23 under general anesthesia. Pulmonary will need to see pt for surgical clearance with CXR. Attempted to reach pt, no answer, LVM for return call. Needs 40 minute appt soon. CXR ordered.

## 2023-05-22 NOTE — TELEPHONE ENCOUNTER
SURGICAL OR PROCEDURAL PULMONARY RISK ASSESSMENT:    Physician or Practice Requesting Clearance: [facility]  : ayanna  Contact Phone: [phone]  Fax number: [phone]  Date of Surgery/Procedure: 6/6/2023  Type of Surgery or Procedure: inplant for inspire  Type of Anesthesia: general  Anticipated Duration of Surgery: 180mins    They also need a medication hold on this        Disp Refills Start End    apixaban (ELIQUIS) 2.5 MG TABS tablet

## 2023-05-23 ENCOUNTER — OFFICE VISIT (OUTPATIENT)
Dept: PULMONOLOGY | Age: 83
End: 2023-05-23
Payer: MEDICARE

## 2023-05-23 ENCOUNTER — HOSPITAL ENCOUNTER (OUTPATIENT)
Dept: GENERAL RADIOLOGY | Age: 83
Discharge: HOME | End: 2023-05-26
Payer: MEDICARE

## 2023-05-23 VITALS
HEIGHT: 66 IN | OXYGEN SATURATION: 99 % | SYSTOLIC BLOOD PRESSURE: 118 MMHG | HEART RATE: 66 BPM | RESPIRATION RATE: 15 BRPM | TEMPERATURE: 98.2 F | DIASTOLIC BLOOD PRESSURE: 68 MMHG | BODY MASS INDEX: 24.8 KG/M2 | WEIGHT: 154.3 LBS

## 2023-05-23 DIAGNOSIS — G47.33 OSA (OBSTRUCTIVE SLEEP APNEA): ICD-10-CM

## 2023-05-23 DIAGNOSIS — Z01.818 PRE-OPERATIVE CLEARANCE: Primary | ICD-10-CM

## 2023-05-23 DIAGNOSIS — Z01.811 PRE-OP CHEST EXAM: ICD-10-CM

## 2023-05-23 DIAGNOSIS — I26.99 RECURRENT PULMONARY EMBOLI (HCC): ICD-10-CM

## 2023-05-23 LAB
EXPIRATORY TIME: NORMAL
FEF 25-75% %PRED-PRE: NORMAL
FEF 25-75% PRED: NORMAL
FEF 25-75%-PRE: NORMAL
FEV1 %PRED-PRE: 141 %
FEV1 PRED: NORMAL
FEV1/FVC %PRED-PRE: NORMAL
FEV1/FVC PRED: NORMAL
FEV1/FVC: 75 %
FEV1: 3.17 L
FVC %PRED-PRE: 130 %
FVC PRED: NORMAL
FVC: 4.22 L
PEF %PRED-PRE: NORMAL
PEF PRED: NORMAL
PEF-PRE: NORMAL

## 2023-05-23 PROCEDURE — 3078F DIAST BP <80 MM HG: CPT | Performed by: INTERNAL MEDICINE

## 2023-05-23 PROCEDURE — 99214 OFFICE O/P EST MOD 30 MIN: CPT | Performed by: INTERNAL MEDICINE

## 2023-05-23 PROCEDURE — 71046 X-RAY EXAM CHEST 2 VIEWS: CPT

## 2023-05-23 PROCEDURE — 3074F SYST BP LT 130 MM HG: CPT | Performed by: INTERNAL MEDICINE

## 2023-05-23 PROCEDURE — G8427 DOCREV CUR MEDS BY ELIG CLIN: HCPCS | Performed by: INTERNAL MEDICINE

## 2023-05-23 PROCEDURE — G8420 CALC BMI NORM PARAMETERS: HCPCS | Performed by: INTERNAL MEDICINE

## 2023-05-23 PROCEDURE — 1036F TOBACCO NON-USER: CPT | Performed by: INTERNAL MEDICINE

## 2023-05-23 PROCEDURE — 1123F ACP DISCUSS/DSCN MKR DOCD: CPT | Performed by: INTERNAL MEDICINE

## 2023-05-23 PROCEDURE — 94010 BREATHING CAPACITY TEST: CPT | Performed by: INTERNAL MEDICINE

## 2023-05-23 ASSESSMENT — PULMONARY FUNCTION TESTS
FEV1/FVC: 75
FVC_PERCENT_PREDICTED_PRE: 130
FVC: 4.22
FEV1_PERCENT_PREDICTED_PRE: 141
FEV1: 3.17

## 2023-05-23 NOTE — PROGRESS NOTES
Patient Name:  Delmer Hitchcock YOB: 1940  MRN: [id number]                                                Office Visit 5/23/2023    ASSESSMENT AND PLAN:  (Medical Decision Making)    1. Pre-operative clearance  There is no pulmonary contraindication to inspire device placement. - Spirometry Without Bronchodilator    2. Recurrent pulmonary emboli (HCC)  Recommend Eliquis to be continued until 2 days prior to the procedure and be started back as promptly as possible postoperatively. He is certainly at higher risk for postop VTE given his history. As his blood clot was more than 3 months ago, there is no clear indication for any heparin bridging according to ACCP guidelines. 3.  MICHAEL  Requires treatment and inspire device planned inearly June. Britt Wheeler MD    Total time for encounter on day of encounter was 30 minutes. This time includes chart prep, review of tests/procedures, review of other provider's notes, documentation and counseling patient regarding disease process and medications. ___________________________________________________________________         ______      REASON FOR VISIT:   No chief complaint on file. HISTORY OF PRESENT ILLNESS:    Mr. Venancio Higgins is a 79 y.o. male with a PMH of  hypertension, GERD, sleep apnea as well as massive PE 10 years ago in [place], recurrent PE 1/16/21. Ultrasound of his legs revealed clots in the calf veins and a right leg but none in the thigh. He was treated with IV heparin and later transitioned to Eliquis. Since 2021 he has had no sequela of his pulmonary embolism and denies shortness of breath or limitation of bleeding. Pulmonary function testing performed today does not demonstrate any COPD, although he does have a 15-pack-year smoking history (quit 1964). Chest x-ray today is clear.     Mr. Evin Sharpe was worked in for preop evaluation prior to inspire device placement by

## 2023-05-25 ENCOUNTER — OFFICE VISIT (OUTPATIENT)
Dept: FAMILY MEDICINE CLINIC | Facility: CLINIC | Age: 83
End: 2023-05-25

## 2023-05-25 VITALS
HEIGHT: 66 IN | HEART RATE: 63 BPM | OXYGEN SATURATION: 98 % | SYSTOLIC BLOOD PRESSURE: 108 MMHG | DIASTOLIC BLOOD PRESSURE: 66 MMHG | BODY MASS INDEX: 24.75 KG/M2 | WEIGHT: 154 LBS | TEMPERATURE: 98.2 F

## 2023-05-25 DIAGNOSIS — I10 ESSENTIAL HYPERTENSION: Primary | ICD-10-CM

## 2023-05-25 DIAGNOSIS — Z78.9 DIFFICULTY WITH CPAP FULL FACE MASK USE: ICD-10-CM

## 2023-05-25 DIAGNOSIS — G31.84 MILD COGNITIVE IMPAIRMENT WITH MEMORY LOSS: ICD-10-CM

## 2023-05-25 DIAGNOSIS — M48.061 SPINAL STENOSIS OF LUMBAR REGION, UNSPECIFIED WHETHER NEUROGENIC CLAUDICATION PRESENT: ICD-10-CM

## 2023-05-25 DIAGNOSIS — M19.072 ARTHRITIS OF BOTH ANKLES: ICD-10-CM

## 2023-05-25 DIAGNOSIS — G47.33 OSA (OBSTRUCTIVE SLEEP APNEA): ICD-10-CM

## 2023-05-25 DIAGNOSIS — K59.09 OTHER CONSTIPATION: ICD-10-CM

## 2023-05-25 DIAGNOSIS — M19.071 ARTHRITIS OF BOTH ANKLES: ICD-10-CM

## 2023-05-25 DIAGNOSIS — K21.9 GASTRO-ESOPHAGEAL REFLUX DISEASE WITHOUT ESOPHAGITIS: ICD-10-CM

## 2023-05-25 DIAGNOSIS — Z00.00 ENCNTR FOR GENERAL ADULT MEDICAL EXAM W/O ABNORMAL FINDINGS: ICD-10-CM

## 2023-05-25 PROBLEM — M47.817 LUMBOSACRAL SPONDYLOSIS WITHOUT MYELOPATHY: Status: ACTIVE | Noted: 2023-05-25

## 2023-05-25 RX ORDER — AMLODIPINE BESYLATE 5 MG/1
5 TABLET ORAL DAILY
Qty: 90 TABLET | Refills: 3 | Status: SHIPPED | OUTPATIENT
Start: 2023-05-25

## 2023-05-25 ASSESSMENT — PATIENT HEALTH QUESTIONNAIRE - PHQ9
1. LITTLE INTEREST OR PLEASURE IN DOING THINGS: 0
2. FEELING DOWN, DEPRESSED OR HOPELESS: 0
SUM OF ALL RESPONSES TO PHQ QUESTIONS 1-9: 0
SUM OF ALL RESPONSES TO PHQ9 QUESTIONS 1 & 2: 0
SUM OF ALL RESPONSES TO PHQ QUESTIONS 1-9: 0

## 2023-05-25 ASSESSMENT — ENCOUNTER SYMPTOMS
GASTROINTESTINAL NEGATIVE: 1
BACK PAIN: 1
EYES NEGATIVE: 1
RESPIRATORY NEGATIVE: 1

## 2023-05-25 NOTE — PROGRESS NOTES
Anai Vargas is a 79 y.o. male who presents today for the following:  Chief Complaint   Patient presents with    New Patient       Allergies   Allergen Reactions    Erythromycin Hives       Current Outpatient Medications   Medication Sig Dispense Refill    amLODIPine (NORVASC) 5 MG tablet Take 1 tablet by mouth daily 90 tablet 3    omeprazole (PRILOSEC) 20 MG delayed release capsule Take 1 capsule by mouth 2 times daily 180 capsule 3    HYDROCODONE-ACETAMINOPHEN PO Take by mouth      apixaban (ELIQUIS) 2.5 MG TABS tablet Take 1 tablet by mouth 2 times daily      docusate (COLACE, DULCOLAX) 100 MG CAPS Take 100 mg by mouth 2 times daily       No current facility-administered medications for this visit. Past Medical History:   Diagnosis Date    Arthritis of both ankles 2021    Bladder tumor     see copy of records    GERD (gastroesophageal reflux disease)     Hiatal hernia     Hypertension     Psychiatric disorder     Pulmonary emboli ([facility] [address]     Sleep apnea        Past Surgical History:   Procedure Laterality Date    [place] UNLISTED PROCEDURE CARDIAC SURGERY      2019    UROLOGICAL SURGERY      bladder surgery for tumor       Social History     Tobacco Use    Smoking status: Former     Packs/day: 1.00     Years: 15.00     Pack years: 15.00     Types: Cigarettes     Quit date: 1964     Years since quittin.5    Smokeless tobacco: Never   Substance Use Topics    Alcohol use: Yes        Family History   Problem Relation Age of Onset    Cancer Paternal Aunt         5 sisters:  colon    Diabetes Sister     Cancer Sister         breast  62s    Diabetes Father     Heart Disease Father         MI 77 yo       HPI   Patient presents new to me and the practice to establish care accompanied by his wife. Patient is 23-year-old male with underlying hypertension, history of pulmonary embolisms on life long anticoagulation, MICHAEL, GERD, and borderline high cholesterol.   No hx of CAD, stents, or heart

## 2023-05-26 PROBLEM — Z00.00 ENCNTR FOR GENERAL ADULT MEDICAL EXAM W/O ABNORMAL FINDINGS: Status: ACTIVE | Noted: 2023-05-26

## 2023-05-31 ENCOUNTER — CLINICAL DOCUMENTATION (OUTPATIENT)
Dept: ORTHOPEDIC SURGERY | Age: 83
End: 2023-05-31

## 2023-05-31 NOTE — PROGRESS NOTES
Patient requested notes mailed to [address] [place] [address] [facility], mailed out. Copy of release to scanning.

## 2023-07-10 ENCOUNTER — OFFICE VISIT (OUTPATIENT)
Dept: SLEEP MEDICINE | Age: 83
End: 2023-07-10
Payer: MEDICARE

## 2023-07-10 VITALS
RESPIRATION RATE: 14 BRPM | DIASTOLIC BLOOD PRESSURE: 84 MMHG | WEIGHT: 151 LBS | HEART RATE: 64 BPM | BODY MASS INDEX: 25.16 KG/M2 | HEIGHT: 65 IN | OXYGEN SATURATION: 98 % | SYSTOLIC BLOOD PRESSURE: 142 MMHG

## 2023-07-10 DIAGNOSIS — Z78.9 DIFFICULTY WITH CPAP FULL FACE MASK USE: ICD-10-CM

## 2023-07-10 DIAGNOSIS — G47.33 OSA (OBSTRUCTIVE SLEEP APNEA): Primary | ICD-10-CM

## 2023-07-10 PROCEDURE — 3079F DIAST BP 80-89 MM HG: CPT | Performed by: INTERNAL MEDICINE

## 2023-07-10 PROCEDURE — G8417 CALC BMI ABV UP PARAM F/U: HCPCS | Performed by: INTERNAL MEDICINE

## 2023-07-10 PROCEDURE — 99214 OFFICE O/P EST MOD 30 MIN: CPT | Performed by: INTERNAL MEDICINE

## 2023-07-10 PROCEDURE — 1123F ACP DISCUSS/DSCN MKR DOCD: CPT | Performed by: INTERNAL MEDICINE

## 2023-07-10 PROCEDURE — 95976 ALYS SMPL CN NPGT PRGRMG: CPT | Performed by: INTERNAL MEDICINE

## 2023-07-10 PROCEDURE — 1036F TOBACCO NON-USER: CPT | Performed by: INTERNAL MEDICINE

## 2023-07-10 PROCEDURE — G8427 DOCREV CUR MEDS BY ELIG CLIN: HCPCS | Performed by: INTERNAL MEDICINE

## 2023-07-10 PROCEDURE — 3077F SYST BP >= 140 MM HG: CPT | Performed by: INTERNAL MEDICINE

## 2023-08-09 ENCOUNTER — TELEPHONE (OUTPATIENT)
Dept: SLEEP MEDICINE | Age: 83
End: 2023-08-09

## 2023-08-09 NOTE — TELEPHONE ENCOUNTER
Patient came to [facility] and stated he know he has an appointment next Tuesday however, he is having problems with his [employer] remote. Patient stated he did not quite know how to use it. Patient stated when he gets up to use bathroom he was told to pause it. He is not quite sure if he is pausing it or how to pause it. Every Monday he was told to push it up a level and he is now at a level \"5\". Patient stated he is now starting to have breathing problems with it on that level and would like to someone prior to his appointment for help. Please advise patient at your earliest convenience.

## 2023-08-10 NOTE — TELEPHONE ENCOUNTER
Called patient and offered him appt to come in tomorrow which he declined since he is scheduled to come in Tuesday of next week. I did give him [employer] clinical support # [phone] ([phone]). Patient not happy wants to talk to MD.will forward to Dr. Keyla Pantoja.

## 2023-08-14 NOTE — PROGRESS NOTES
Hypertension     Psychiatric disorder     Pulmonary emboli (HCC)     Sleep apnea          Patient Active Problem List   Diagnosis    Arthritis of both ankles    Leukocytosis    Other constipation    Spinal stenosis of lumbar region    Mild cognitive impairment with memory loss    Difficulty with CPAP full face mask use    MICHAEL (obstructive sleep apnea)    Abdominal muscle strain, initial encounter    Pulmonary embolism (HCC)    Acute pulmonary embolus (HCC)    Essential hypertension    Atypical nevi    Lumbosacral spondylosis without myelopathy    Encntr for general adult medical exam w/o abnormal findings          Past Surgical History:   Procedure Laterality Date    [place] UNLISTED PROCEDURE CARDIAC SURGERY      2019    UROLOGICAL SURGERY      bladder surgery for tumor       [unfilled]        Social History     Socioeconomic History    Marital status:      Spouse name: Not on file    Number of children: Not on file    Years of education: Not on file    Highest education level: Not on file   Occupational History    Not on file   Tobacco Use    Smoking status: Former     Packs/day: 1.00     Years: 15.00     Pack years: 15.00     Types: Cigarettes     Quit date: 1964     Years since quittin.7    Smokeless tobacco: Never   Vaping Use    Vaping Use: Never used   Substance and Sexual Activity    Alcohol use: Yes    Drug use: No    Sexual activity: Not on file   Other Topics Concern    Not on file   Social History Narrative    Not on file     Social Determinants of Health     Financial Resource Strain: Low Risk     Difficulty of Paying Living Expenses: Not hard at all   Food Insecurity: No Food Insecurity    Worried About Running Out of Food in the Last Year: Never true    Ran Out of Food in the Last Year: Never true   Transportation Needs: Unknown    Lack of Transportation (Medical): Not on file    Lack of Transportation (Non-Medical):  No   Physical Activity: Not on file   Stress: Not on file   Social

## 2023-08-15 ENCOUNTER — OFFICE VISIT (OUTPATIENT)
Dept: SLEEP MEDICINE | Age: 83
End: 2023-08-15
Payer: MEDICARE

## 2023-08-15 VITALS
HEIGHT: 65 IN | BODY MASS INDEX: 25.33 KG/M2 | WEIGHT: 152 LBS | SYSTOLIC BLOOD PRESSURE: 132 MMHG | DIASTOLIC BLOOD PRESSURE: 82 MMHG | OXYGEN SATURATION: 99 % | HEART RATE: 75 BPM | RESPIRATION RATE: 14 BRPM

## 2023-08-15 DIAGNOSIS — G47.33 OSA (OBSTRUCTIVE SLEEP APNEA): Primary | ICD-10-CM

## 2023-08-15 DIAGNOSIS — Z78.9 DIFFICULTY WITH CPAP FULL FACE MASK USE: ICD-10-CM

## 2023-08-15 PROCEDURE — 3075F SYST BP GE 130 - 139MM HG: CPT | Performed by: INTERNAL MEDICINE

## 2023-08-15 PROCEDURE — G8427 DOCREV CUR MEDS BY ELIG CLIN: HCPCS | Performed by: INTERNAL MEDICINE

## 2023-08-15 PROCEDURE — 99214 OFFICE O/P EST MOD 30 MIN: CPT | Performed by: INTERNAL MEDICINE

## 2023-08-15 PROCEDURE — G8417 CALC BMI ABV UP PARAM F/U: HCPCS | Performed by: INTERNAL MEDICINE

## 2023-08-15 PROCEDURE — 1123F ACP DISCUSS/DSCN MKR DOCD: CPT | Performed by: INTERNAL MEDICINE

## 2023-08-15 PROCEDURE — 95976 ALYS SMPL CN NPGT PRGRMG: CPT | Performed by: INTERNAL MEDICINE

## 2023-08-15 PROCEDURE — 3079F DIAST BP 80-89 MM HG: CPT | Performed by: INTERNAL MEDICINE

## 2023-08-15 PROCEDURE — 1036F TOBACCO NON-USER: CPT | Performed by: INTERNAL MEDICINE

## 2023-08-15 ASSESSMENT — SLEEP AND FATIGUE QUESTIONNAIRES
HOW LIKELY ARE YOU TO NOD OFF OR FALL ASLEEP WHILE SITTING AND TALKING TO SOMEONE: 0
HOW LIKELY ARE YOU TO NOD OFF OR FALL ASLEEP IN A CAR, WHILE STOPPED FOR A FEW MINUTES IN TRAFFIC: 0
HOW LIKELY ARE YOU TO NOD OFF OR FALL ASLEEP WHILE SITTING INACTIVE IN A PUBLIC PLACE: 2
HOW LIKELY ARE YOU TO NOD OFF OR FALL ASLEEP WHEN YOU ARE A PASSENGER IN A CAR FOR AN HOUR WITHOUT A BREAK: 0
HOW LIKELY ARE YOU TO NOD OFF OR FALL ASLEEP WHILE SITTING AND READING: 3
HOW LIKELY ARE YOU TO NOD OFF OR FALL ASLEEP WHILE WATCHING TV: 3
ESS TOTAL SCORE: 14
HOW LIKELY ARE YOU TO NOD OFF OR FALL ASLEEP WHILE LYING DOWN TO REST IN THE AFTERNOON WHEN CIRCUMSTANCES PERMIT: 3
HOW LIKELY ARE YOU TO NOD OFF OR FALL ASLEEP WHILE SITTING QUIETLY AFTER LUNCH WITHOUT ALCOHOL: 3

## 2023-09-07 ENCOUNTER — TELEPHONE (OUTPATIENT)
Dept: SLEEP MEDICINE | Age: 83
End: 2023-09-07

## 2023-09-07 NOTE — TELEPHONE ENCOUNTER
Patient has appointment on 9/21 with Dr. Tad Bunch. He is leaving to go out of town for about 2 months on 09/16. Asking if he needs to be seen before he leaves.   Also if he has any problems while he is gone who should he call

## 2023-09-11 ENCOUNTER — APPOINTMENT (OUTPATIENT)
Dept: GENERAL RADIOLOGY | Age: 83
End: 2023-09-11
Payer: MEDICARE

## 2023-09-11 ENCOUNTER — APPOINTMENT (OUTPATIENT)
Dept: CT IMAGING | Age: 83
End: 2023-09-11
Payer: MEDICARE

## 2023-09-11 ENCOUNTER — HOSPITAL ENCOUNTER (EMERGENCY)
Age: 83
Discharge: HOME | End: 2023-09-12
Attending: EMERGENCY MEDICINE
Payer: MEDICARE

## 2023-09-11 DIAGNOSIS — R07.9 CHEST PAIN, UNSPECIFIED TYPE: Primary | ICD-10-CM

## 2023-09-11 LAB
ANION GAP SERPL CALC-SCNC: 7 MMOL/L (ref 2–11)
BASOPHILS # BLD: 0.1 K/UL (ref 0–0.2)
BASOPHILS NFR BLD: 0 % (ref 0–2)
BUN SERPL-MCNC: 13 MG/DL (ref 8–23)
CALCIUM SERPL-MCNC: 9.7 MG/DL (ref 8.3–10.4)
CHLORIDE SERPL-SCNC: 110 MMOL/L (ref 101–110)
CO2 SERPL-SCNC: 25 MMOL/L (ref 21–32)
CREAT SERPL-MCNC: 1.2 MG/DL (ref 0.8–1.5)
DIFFERENTIAL METHOD BLD: ABNORMAL
EOSINOPHIL # BLD: 0.1 K/UL (ref 0–0.8)
EOSINOPHIL NFR BLD: 1 % (ref 0.5–7.8)
ERYTHROCYTE [DISTWIDTH] IN BLOOD BY AUTOMATED COUNT: 13.7 % (ref 11.9–14.6)
GLUCOSE SERPL-MCNC: 123 MG/DL (ref 65–100)
HCT VFR BLD AUTO: 45 % (ref 41.1–50.3)
HGB BLD-MCNC: 15 G/DL (ref 13.6–17.2)
IMM GRANULOCYTES # BLD AUTO: 0.1 K/UL (ref 0–0.5)
IMM GRANULOCYTES NFR BLD AUTO: 1 % (ref 0–5)
LYMPHOCYTES # BLD: 2.5 K/UL (ref 0.5–4.6)
LYMPHOCYTES NFR BLD: 21 % (ref 13–44)
MCH RBC QN AUTO: 27.6 PG (ref 26.1–32.9)
MCHC RBC AUTO-ENTMCNC: 33.3 G/DL (ref 31.4–35)
MCV RBC AUTO: 82.9 FL (ref 82–102)
MONOCYTES # BLD: 0.9 K/UL (ref 0.1–1.3)
MONOCYTES NFR BLD: 7 % (ref 4–12)
NEUTS SEG # BLD: 8.4 K/UL (ref 1.7–8.2)
NEUTS SEG NFR BLD: 70 % (ref 43–78)
NRBC # BLD: 0 K/UL (ref 0–0.2)
PLATELET # BLD AUTO: 278 K/UL (ref 150–450)
PMV BLD AUTO: 8.9 FL (ref 9.4–12.3)
POTASSIUM SERPL-SCNC: 3.6 MMOL/L (ref 3.5–5.1)
RBC # BLD AUTO: 5.43 M/UL (ref 4.23–5.6)
SODIUM SERPL-SCNC: 142 MMOL/L (ref 133–143)
TROPONIN I SERPL HS-MCNC: 10.1 PG/ML (ref 0–14)
TROPONIN I SERPL HS-MCNC: 9.1 PG/ML (ref 0–14)
WBC # BLD AUTO: 11.9 K/UL (ref 4.3–11.1)

## 2023-09-11 PROCEDURE — 99285 EMERGENCY DEPT VISIT HI MDM: CPT

## 2023-09-11 PROCEDURE — 80048 BASIC METABOLIC PNL TOTAL CA: CPT

## 2023-09-11 PROCEDURE — 96375 TX/PRO/DX INJ NEW DRUG ADDON: CPT

## 2023-09-11 PROCEDURE — 6360000004 HC RX CONTRAST MEDICATION: Performed by: EMERGENCY MEDICINE

## 2023-09-11 PROCEDURE — 85025 COMPLETE CBC W/AUTO DIFF WBC: CPT

## 2023-09-11 PROCEDURE — 84484 ASSAY OF TROPONIN QUANT: CPT

## 2023-09-11 PROCEDURE — 71045 X-RAY EXAM CHEST 1 VIEW: CPT

## 2023-09-11 PROCEDURE — 93005 ELECTROCARDIOGRAM TRACING: CPT | Performed by: EMERGENCY MEDICINE

## 2023-09-11 PROCEDURE — 71260 CT THORAX DX C+: CPT

## 2023-09-11 PROCEDURE — 2580000003 HC RX 258: Performed by: EMERGENCY MEDICINE

## 2023-09-11 PROCEDURE — 6360000002 HC RX W HCPCS

## 2023-09-11 PROCEDURE — 6370000000 HC RX 637 (ALT 250 FOR IP): Performed by: EMERGENCY MEDICINE

## 2023-09-11 PROCEDURE — 96374 THER/PROPH/DIAG INJ IV PUSH: CPT

## 2023-09-11 RX ORDER — LIDOCAINE HYDROCHLORIDE 20 MG/ML
15 SOLUTION OROPHARYNGEAL
Status: COMPLETED | OUTPATIENT
Start: 2023-09-11 | End: 2023-09-11

## 2023-09-11 RX ORDER — DICYCLOMINE HYDROCHLORIDE 10 MG/1
10 CAPSULE ORAL
Status: COMPLETED | OUTPATIENT
Start: 2023-09-11 | End: 2023-09-11

## 2023-09-11 RX ORDER — SODIUM CHLORIDE 0.9 % (FLUSH) 0.9 %
10 SYRINGE (ML) INJECTION
Status: COMPLETED | OUTPATIENT
Start: 2023-09-11 | End: 2023-09-11

## 2023-09-11 RX ORDER — LORAZEPAM 2 MG/ML
1 INJECTION INTRAMUSCULAR ONCE
Status: COMPLETED | OUTPATIENT
Start: 2023-09-11 | End: 2023-09-12

## 2023-09-11 RX ORDER — MORPHINE SULFATE 4 MG/ML
4 INJECTION, SOLUTION INTRAMUSCULAR; INTRAVENOUS
Status: COMPLETED | OUTPATIENT
Start: 2023-09-11 | End: 2023-09-11

## 2023-09-11 RX ORDER — MORPHINE SULFATE 4 MG/ML
INJECTION, SOLUTION INTRAMUSCULAR; INTRAVENOUS
Status: COMPLETED
Start: 2023-09-11 | End: 2023-09-11

## 2023-09-11 RX ORDER — MAGNESIUM HYDROXIDE/ALUMINUM HYDROXICE/SIMETHICONE 120; 1200; 1200 MG/30ML; MG/30ML; MG/30ML
30 SUSPENSION ORAL
Status: COMPLETED | OUTPATIENT
Start: 2023-09-11 | End: 2023-09-11

## 2023-09-11 RX ORDER — 0.9 % SODIUM CHLORIDE 0.9 %
100 INTRAVENOUS SOLUTION INTRAVENOUS ONCE
Status: COMPLETED | OUTPATIENT
Start: 2023-09-11 | End: 2023-09-11

## 2023-09-11 RX ADMIN — DICYCLOMINE HYDROCHLORIDE 10 MG: 10 CAPSULE ORAL at 23:01

## 2023-09-11 RX ADMIN — SODIUM CHLORIDE, PRESERVATIVE FREE 10 ML: 5 INJECTION INTRAVENOUS at 21:40

## 2023-09-11 RX ADMIN — LIDOCAINE HYDROCHLORIDE 15 ML: 20 SOLUTION ORAL; TOPICAL at 23:01

## 2023-09-11 RX ADMIN — MORPHINE SULFATE 4 MG: 4 INJECTION INTRAVENOUS at 21:27

## 2023-09-11 RX ADMIN — IOPAMIDOL 100 ML: 755 INJECTION, SOLUTION INTRAVENOUS at 21:39

## 2023-09-11 RX ADMIN — MORPHINE SULFATE 4 MG: 4 INJECTION, SOLUTION INTRAMUSCULAR; INTRAVENOUS at 21:27

## 2023-09-11 RX ADMIN — ALUMINUM HYDROXIDE, MAGNESIUM HYDROXIDE, AND SIMETHICONE 30 ML: 200; 200; 20 SUSPENSION ORAL at 23:01

## 2023-09-11 RX ADMIN — SODIUM CHLORIDE 100 ML: 9 INJECTION, SOLUTION INTRAVENOUS at 21:40

## 2023-09-11 ASSESSMENT — ENCOUNTER SYMPTOMS
SORE THROAT: 0
VOMITING: 0
COUGH: 0
SHORTNESS OF BREATH: 1
BACK PAIN: 0
ABDOMINAL PAIN: 0
NAUSEA: 0

## 2023-09-11 ASSESSMENT — PAIN - FUNCTIONAL ASSESSMENT: PAIN_FUNCTIONAL_ASSESSMENT: 0-10

## 2023-09-11 ASSESSMENT — PAIN SCALES - GENERAL
PAINLEVEL_OUTOF10: 8
PAINLEVEL_OUTOF10: 6

## 2023-09-11 NOTE — TELEPHONE ENCOUNTER
Called patient back and got VM.  Advised him to call when he backs form being out of town and gave him clinical support # for [employer] [phone]

## 2023-09-12 VITALS
RESPIRATION RATE: 24 BRPM | HEIGHT: 65 IN | DIASTOLIC BLOOD PRESSURE: 86 MMHG | OXYGEN SATURATION: 93 % | BODY MASS INDEX: 24.99 KG/M2 | TEMPERATURE: 97.8 F | WEIGHT: 150 LBS | HEART RATE: 107 BPM | SYSTOLIC BLOOD PRESSURE: 122 MMHG

## 2023-09-12 LAB
EKG ATRIAL RATE: 92 BPM
EKG DIAGNOSIS: NORMAL
EKG P AXIS: 10 DEGREES
EKG P-R INTERVAL: 140 MS
EKG Q-T INTERVAL: 355 MS
EKG QRS DURATION: 98 MS
EKG QTC CALCULATION (BAZETT): 440 MS
EKG R AXIS: -69 DEGREES
EKG T AXIS: 51 DEGREES
EKG VENTRICULAR RATE: 92 BPM

## 2023-09-12 PROCEDURE — 6360000002 HC RX W HCPCS: Performed by: EMERGENCY MEDICINE

## 2023-09-12 PROCEDURE — 93010 ELECTROCARDIOGRAM REPORT: CPT | Performed by: INTERNAL MEDICINE

## 2023-09-12 RX ADMIN — LORAZEPAM 1 MG: 2 INJECTION INTRAMUSCULAR; INTRAVENOUS at 00:06

## 2023-09-12 NOTE — ED PROVIDER NOTES
[facility] Provider Note                   PCP:                MARGIE Bermudez CNP               Age: 79 y.o. Sex: male     MEDICAL DECISION MAKING  Complexity of Problems Addressed:   1 or more acute illness/injury that poses a threat to life or bodily function    Data Reviewed and Analyzed:  Category 1:    I have reviewed outside records from an external source for any pertinent PMH, ED visits, primary care visits, specialist visits, labs, EKG, and/or radiologic studies. Category 2:     ED EKG Interpretation  EKG was interpreted in the absence of a cardiologist.    Rate: Rate: Normal  EKG Interpretation: EKG Interpretation: sinus rhythm, no evidence of arrhythmia  ST Segments: Nonspecific ST segments - NO STEMI      I independently ordered and reviewed the labs. I have reviewed the Radiologist interpretation of the radiologic studies. The management of this patient was discussed with an external consultant. Category 3:     Discussion of management or test interpretation:    MDM  Number of Diagnoses or Management Options  Chest pain, unspecified type  Diagnosis management comments: Patient presented for chest pain, shortness of breath, and anxiety that started this evening. Patient's EKG showed normal sinus rhythm with nonspecific ST changes. Patient's serial troponins were normal and ACS was ruled out. Patient's CBC and BMP showed no significant abnormalities. His CT angiogram was negative for pulmonary embolism or other acute process. Patient is anticoagulated on Eliquis. Patient did not have any relief of chest pain with nitroglycerin by EMS nor GI cocktail in the ED. He did have almost resolution of his chest pain with Ativan. Patient has been under a lot of stress due to his wife's cancer treatment. I did discuss the case with Dr. Jack Booth of cardiology.   Patient had an essentially normal cardiac catheterization in 2019 and Dr. Jack Booth did not feel that

## 2023-09-12 NOTE — ED TRIAGE NOTES
Pt arrives via [facility] coming from home at private residence c/o mid sternal CP that started 1 hr ago. 1 nitro given by EMS. . Pt describes the pain as related to his hiatal hernia.  by EMS. Pt was placed on @L NC for comfort and does not wear home 02.

## 2023-12-08 DIAGNOSIS — I10 ESSENTIAL HYPERTENSION: ICD-10-CM

## 2023-12-08 RX ORDER — AMLODIPINE BESYLATE 5 MG/1
5 TABLET ORAL DAILY
Qty: 90 TABLET | Refills: 3 | Status: SHIPPED | OUTPATIENT
Start: 2023-12-08

## 2023-12-08 NOTE — TELEPHONE ENCOUNTER
Pt requesting a refill he is completely out of medication. He is in [place] with wife she has pancreatic cancer and is receiving treatments. He needs the script sent to [facility] so he can get it at the [facility] in [place]. I have updated the pharmacy and pended the medication.

## 2024-01-08 DIAGNOSIS — K21.9 GASTRO-ESOPHAGEAL REFLUX DISEASE WITHOUT ESOPHAGITIS: ICD-10-CM

## 2024-01-08 RX ORDER — OMEPRAZOLE 20 MG/1
20 CAPSULE, DELAYED RELEASE ORAL 2 TIMES DAILY
Qty: 180 CAPSULE | Refills: 3 | Status: SHIPPED | OUTPATIENT
Start: 2024-01-08

## 2024-01-08 NOTE — TELEPHONE ENCOUNTER
Pt requesting refill on his omeprazole. Pt is currently in [place] with his wife and will call to get medication transferred.

## 2024-07-30 ENCOUNTER — OFFICE VISIT (OUTPATIENT)
Dept: PULMONOLOGY | Age: 84
End: 2024-07-30
Payer: MEDICARE

## 2024-07-30 VITALS
SYSTOLIC BLOOD PRESSURE: 135 MMHG | HEIGHT: 64 IN | OXYGEN SATURATION: 98 % | TEMPERATURE: 98.2 F | RESPIRATION RATE: 18 BRPM | DIASTOLIC BLOOD PRESSURE: 81 MMHG | WEIGHT: 150 LBS | BODY MASS INDEX: 25.61 KG/M2 | HEART RATE: 68 BPM

## 2024-07-30 DIAGNOSIS — Z91.89 AT HIGH RISK FOR VENOUS THROMBOEMBOLISM: Primary | ICD-10-CM

## 2024-07-30 PROCEDURE — 99213 OFFICE O/P EST LOW 20 MIN: CPT | Performed by: INTERNAL MEDICINE

## 2024-07-30 PROCEDURE — 3079F DIAST BP 80-89 MM HG: CPT | Performed by: INTERNAL MEDICINE

## 2024-07-30 PROCEDURE — 1036F TOBACCO NON-USER: CPT | Performed by: INTERNAL MEDICINE

## 2024-07-30 PROCEDURE — 3075F SYST BP GE 130 - 139MM HG: CPT | Performed by: INTERNAL MEDICINE

## 2024-07-30 PROCEDURE — 1123F ACP DISCUSS/DSCN MKR DOCD: CPT | Performed by: INTERNAL MEDICINE

## 2024-07-30 PROCEDURE — G8427 DOCREV CUR MEDS BY ELIG CLIN: HCPCS | Performed by: INTERNAL MEDICINE

## 2024-07-30 PROCEDURE — G8417 CALC BMI ABV UP PARAM F/U: HCPCS | Performed by: INTERNAL MEDICINE

## 2024-07-30 NOTE — PATIENT INSTRUCTIONS
For Eliquis, inquire with pharmacies below for more cost efficient option.    [facility]  [id number] [facility] [address]  Toll Free: [phone]  Fax: [phone]  [email or web address]    Or    [email or web address]  [address] [facility] [address]  [facility]  [address]  [place]  Toll Free: [phone]  Fax: [phone]    Create an account with the selected pharmacy then call the office at [phone] with pharmacy name and account number. Prescription will faxed to selected pharmacy.

## 2024-07-30 NOTE — PROGRESS NOTES
[facility] and together they recently returned home where she will continue maintenance therapy. His wife attends visit with him today and expresses some concerns about his memory.  He's been forgetting where things are located, left his wallet at restaurants lately.  She'd like for him to see a neurologist.  She mentions she is concerned about his blood thinner because her father  of a massive ICH while on an anticoagulant.  He bruises easily but has not had any major bleeding.        Tobacco Use      Smoking status: Former        Packs/day: 0.00        Years: 1 pack/day for 15.0 years (15.0 ttl pk-yrs)        Types: Cigarettes        Start date: 1949        Quit date: 1964        Years since quittin.6      Smokeless tobacco: Never      REVIEW OF SYSTEMS:   10 point review of systems is negative except as reported in HPI.    PHYSICAL EXAM:   Vitals:    24 1514   BP: 135/81   Site: Left Upper Arm   Position: Sitting   Cuff Size: Medium Adult   Pulse: 68   Resp: 18   Temp: 98.2 °F (36.8 °C)   SpO2: 98%   Weight: 68 kg (150 lb)   Height: 1.626 m (5' 4\")     Body mass index is 25.75 kg/m².      General:   Alert, cooperative, no distress, appears stated age.        Eyes/Ears/Nose:   Conjunctivae/corneas clear. PERRL. Nasal mucosa is normal.  Normal TMs and external auditory canals.        Mouth/Throat:  Lips, mucosa, and tongue normal. Teeth and gums normal.        Lungs:     CTAB     Heart:   Regular rate and rhythm, S1, S2 normal, no murmur, click, rub or gallop.     Abdomen:    Soft, non-tender.     Extremities:  Extremities normal, atraumatic, no cyanosis or edema.     Skin:  Skin color normal. No rashes or lesions     Neurologic:  A&Ox3     DIAGNOSTIC TESTS:                                                                                                                        Pulmonary Function Testing: normal PFT  Date  23        FVC 4.22 (130%)        FEV1 3.17 (141%)

## 2024-08-21 ENCOUNTER — TELEPHONE (OUTPATIENT)
Dept: FAMILY MEDICINE CLINIC | Facility: CLINIC | Age: 84
End: 2024-08-21

## 2024-08-21 NOTE — TELEPHONE ENCOUNTER
----- Message from Massiel RAMOS sent at 8/20/2024 12:35 PM EDT -----  Regarding: ECC Appointment Request  ECC Appointment Request    Patient needs appointment for ECC Appointment Type: Annual Visit.    Patient Requested Dates(s): August 27, 2024  Patient Requested Time: 1 PM   Provider Name: Cathy Rosas, APRN - CNP    Reason for Appointment Request: Established Patient - Available appointments did not meet patient need    The patient together with his wife wants to move their appt from August 28 to 27 of August.  --------------------------------------------------------------------------------------------------------------------------    Relationship to Patient: Self     Call Back Information: OK to leave message on [employer]  Preferred Call Back Number: Phone [phone]

## 2024-08-28 ENCOUNTER — OFFICE VISIT (OUTPATIENT)
Dept: FAMILY MEDICINE CLINIC | Facility: CLINIC | Age: 84
End: 2024-08-28
Payer: MEDICARE

## 2024-08-28 VITALS
HEART RATE: 58 BPM | BODY MASS INDEX: 25.75 KG/M2 | DIASTOLIC BLOOD PRESSURE: 70 MMHG | SYSTOLIC BLOOD PRESSURE: 128 MMHG | TEMPERATURE: 97.8 F | WEIGHT: 150 LBS | OXYGEN SATURATION: 97 %

## 2024-08-28 DIAGNOSIS — R25.2 CRAMPS OF LOWER EXTREMITY: ICD-10-CM

## 2024-08-28 DIAGNOSIS — E55.9 VITAMIN D DEFICIENCY: ICD-10-CM

## 2024-08-28 DIAGNOSIS — Z79.01 CHRONIC ANTICOAGULATION: ICD-10-CM

## 2024-08-28 DIAGNOSIS — R35.1 NOCTURIA: ICD-10-CM

## 2024-08-28 DIAGNOSIS — R53.83 FATIGUE, UNSPECIFIED TYPE: ICD-10-CM

## 2024-08-28 DIAGNOSIS — I10 ESSENTIAL HYPERTENSION: Primary | ICD-10-CM

## 2024-08-28 DIAGNOSIS — G31.84 MILD COGNITIVE IMPAIRMENT WITH MEMORY LOSS: ICD-10-CM

## 2024-08-28 DIAGNOSIS — Z86.711 HISTORY OF PULMONARY EMBOLISM: ICD-10-CM

## 2024-08-28 DIAGNOSIS — R97.20 ELEVATED PSA: ICD-10-CM

## 2024-08-28 DIAGNOSIS — K21.9 GASTRO-ESOPHAGEAL REFLUX DISEASE WITHOUT ESOPHAGITIS: ICD-10-CM

## 2024-08-28 DIAGNOSIS — R35.0 FREQUENCY OF MICTURITION: ICD-10-CM

## 2024-08-28 DIAGNOSIS — Z91.89 AT HIGH RISK FOR VENOUS THROMBOEMBOLISM: ICD-10-CM

## 2024-08-28 DIAGNOSIS — Z00.00 MEDICARE ANNUAL WELLNESS VISIT, SUBSEQUENT: ICD-10-CM

## 2024-08-28 DIAGNOSIS — G47.33 OSA (OBSTRUCTIVE SLEEP APNEA): ICD-10-CM

## 2024-08-28 DIAGNOSIS — I10 ESSENTIAL HYPERTENSION: ICD-10-CM

## 2024-08-28 PROBLEM — I26.99 PULMONARY EMBOLISM (HCC): Status: RESOLVED | Noted: 2021-01-18 | Resolved: 2024-08-28

## 2024-08-28 PROBLEM — I26.99 ACUTE PULMONARY EMBOLUS (HCC): Status: RESOLVED | Noted: 2021-01-16 | Resolved: 2024-08-28

## 2024-08-28 LAB
25(OH)D3 SERPL-MCNC: 32.4 NG/ML (ref 30–100)
ALBUMIN SERPL-MCNC: 4.2 G/DL (ref 3.2–4.6)
ALBUMIN/GLOB SERPL: 1.4 (ref 1–1.9)
ALP SERPL-CCNC: 101 U/L (ref 40–129)
ALT SERPL-CCNC: 16 U/L (ref 12–65)
ANION GAP SERPL CALC-SCNC: 12 MMOL/L (ref 9–18)
APPEARANCE UR: CLEAR
AST SERPL-CCNC: 27 U/L (ref 15–37)
BACTERIA URNS QL MICRO: NEGATIVE /HPF
BASOPHILS # BLD: 0.1 K/UL (ref 0–0.2)
BASOPHILS NFR BLD: 1 % (ref 0–2)
BILIRUB SERPL-MCNC: 0.5 MG/DL (ref 0–1.2)
BILIRUB UR QL: NEGATIVE
BUN SERPL-MCNC: 14 MG/DL (ref 8–23)
CALCIUM SERPL-MCNC: 10.1 MG/DL (ref 8.8–10.2)
CASTS URNS QL MICRO: 0 /LPF
CHLORIDE SERPL-SCNC: 106 MMOL/L (ref 98–107)
CHOLEST SERPL-MCNC: 219 MG/DL (ref 0–200)
CO2 SERPL-SCNC: 26 MMOL/L (ref 20–28)
COLOR UR: ABNORMAL
CREAT SERPL-MCNC: 0.88 MG/DL (ref 0.8–1.3)
CRYSTALS URNS QL MICRO: 0 /LPF
DIFFERENTIAL METHOD BLD: ABNORMAL
EOSINOPHIL # BLD: 0.1 K/UL (ref 0–0.8)
EOSINOPHIL NFR BLD: 2 % (ref 0.5–7.8)
EPI CELLS #/AREA URNS HPF: ABNORMAL /HPF (ref 0–5)
ERYTHROCYTE [DISTWIDTH] IN BLOOD BY AUTOMATED COUNT: 14.4 % (ref 11.9–14.6)
GLOBULIN SER CALC-MCNC: 2.9 G/DL (ref 2.3–3.5)
GLUCOSE SERPL-MCNC: 93 MG/DL (ref 70–99)
GLUCOSE UR STRIP.AUTO-MCNC: NEGATIVE MG/DL
HCT VFR BLD AUTO: 45.5 % (ref 41.1–50.3)
HDLC SERPL-MCNC: 41 MG/DL (ref 40–60)
HDLC SERPL: 5.4 (ref 0–5)
HGB BLD-MCNC: 14.7 G/DL (ref 13.6–17.2)
HGB UR QL STRIP: NEGATIVE
HYALINE CASTS URNS QL MICRO: ABNORMAL /LPF
IMM GRANULOCYTES # BLD AUTO: 0 K/UL (ref 0–0.5)
IMM GRANULOCYTES NFR BLD AUTO: 1 % (ref 0–5)
KETONES UR QL STRIP.AUTO: NEGATIVE MG/DL
LDLC SERPL CALC-MCNC: 157 MG/DL (ref 0–100)
LEUKOCYTE ESTERASE UR QL STRIP.AUTO: NEGATIVE
LYMPHOCYTES # BLD: 2 K/UL (ref 0.5–4.6)
LYMPHOCYTES NFR BLD: 25 % (ref 13–44)
MAGNESIUM SERPL-MCNC: 2.3 MG/DL (ref 1.8–2.4)
MCH RBC QN AUTO: 28.3 PG (ref 26.1–32.9)
MCHC RBC AUTO-ENTMCNC: 32.3 G/DL (ref 31.4–35)
MCV RBC AUTO: 87.5 FL (ref 82–102)
MONOCYTES # BLD: 0.5 K/UL (ref 0.1–1.3)
MONOCYTES NFR BLD: 7 % (ref 4–12)
MUCOUS THREADS URNS QL MICRO: 0 /LPF
NEUTS SEG # BLD: 5.1 K/UL (ref 1.7–8.2)
NEUTS SEG NFR BLD: 64 % (ref 43–78)
NITRITE UR QL STRIP.AUTO: NEGATIVE
NRBC # BLD: 0 K/UL (ref 0–0.2)
PH UR STRIP: 5 (ref 5–9)
PLATELET # BLD AUTO: 323 K/UL (ref 150–450)
PMV BLD AUTO: 9.3 FL (ref 9.4–12.3)
POTASSIUM SERPL-SCNC: 4.1 MMOL/L (ref 3.5–5.1)
PROT SERPL-MCNC: 7.2 G/DL (ref 6.3–8.2)
PROT UR STRIP-MCNC: ABNORMAL MG/DL
PSA SERPL-MCNC: 7.2 NG/ML (ref 0–4)
RBC # BLD AUTO: 5.2 M/UL (ref 4.23–5.6)
RBC #/AREA URNS HPF: ABNORMAL /HPF (ref 0–5)
SODIUM SERPL-SCNC: 144 MMOL/L (ref 136–145)
SP GR UR REFRACTOMETRY: 1.02 (ref 1–1.02)
TRIGL SERPL-MCNC: 107 MG/DL (ref 0–150)
TSH W FREE THYROID IF ABNORMAL: 1.56 UIU/ML (ref 0.27–4.2)
URINE CULTURE IF INDICATED: ABNORMAL
UROBILINOGEN UR QL STRIP.AUTO: 0.2 EU/DL (ref 0.2–1)
VIT B12 SERPL-MCNC: 279 PG/ML (ref 193–986)
VLDLC SERPL CALC-MCNC: 21 MG/DL (ref 6–23)
WBC # BLD AUTO: 7.9 K/UL (ref 4.3–11.1)
WBC URNS QL MICRO: ABNORMAL /HPF (ref 0–4)

## 2024-08-28 PROCEDURE — 99214 OFFICE O/P EST MOD 30 MIN: CPT | Performed by: NURSE PRACTITIONER

## 2024-08-28 PROCEDURE — 3074F SYST BP LT 130 MM HG: CPT | Performed by: NURSE PRACTITIONER

## 2024-08-28 PROCEDURE — G8417 CALC BMI ABV UP PARAM F/U: HCPCS | Performed by: NURSE PRACTITIONER

## 2024-08-28 PROCEDURE — G0439 PPPS, SUBSEQ VISIT: HCPCS | Performed by: NURSE PRACTITIONER

## 2024-08-28 PROCEDURE — 1036F TOBACCO NON-USER: CPT | Performed by: NURSE PRACTITIONER

## 2024-08-28 PROCEDURE — 3078F DIAST BP <80 MM HG: CPT | Performed by: NURSE PRACTITIONER

## 2024-08-28 PROCEDURE — 1123F ACP DISCUSS/DSCN MKR DOCD: CPT | Performed by: NURSE PRACTITIONER

## 2024-08-28 PROCEDURE — G8427 DOCREV CUR MEDS BY ELIG CLIN: HCPCS | Performed by: NURSE PRACTITIONER

## 2024-08-28 RX ORDER — UREA 10 %
500 LOTION (ML) TOPICAL DAILY
Qty: 90 TABLET | Refills: 3 | Status: SHIPPED | OUTPATIENT
Start: 2024-08-28 | End: 2025-08-23

## 2024-08-28 RX ORDER — AMLODIPINE BESYLATE 5 MG/1
5 TABLET ORAL DAILY
Qty: 90 TABLET | Refills: 3 | Status: SHIPPED | OUTPATIENT
Start: 2024-08-28

## 2024-08-28 SDOH — ECONOMIC STABILITY: FOOD INSECURITY: WITHIN THE PAST 12 MONTHS, THE FOOD YOU BOUGHT JUST DIDN'T LAST AND YOU DIDN'T HAVE MONEY TO GET MORE.: NEVER TRUE

## 2024-08-28 SDOH — ECONOMIC STABILITY: FOOD INSECURITY: WITHIN THE PAST 12 MONTHS, YOU WORRIED THAT YOUR FOOD WOULD RUN OUT BEFORE YOU GOT MONEY TO BUY MORE.: NEVER TRUE

## 2024-08-28 SDOH — ECONOMIC STABILITY: INCOME INSECURITY: HOW HARD IS IT FOR YOU TO PAY FOR THE VERY BASICS LIKE FOOD, HOUSING, MEDICAL CARE, AND HEATING?: NOT HARD AT ALL

## 2024-08-28 ASSESSMENT — PATIENT HEALTH QUESTIONNAIRE - PHQ9
SUM OF ALL RESPONSES TO PHQ QUESTIONS 1-9: 1
1. LITTLE INTEREST OR PLEASURE IN DOING THINGS: NOT AT ALL
SUM OF ALL RESPONSES TO PHQ9 QUESTIONS 1 & 2: 1
2. FEELING DOWN, DEPRESSED OR HOPELESS: SEVERAL DAYS
SUM OF ALL RESPONSES TO PHQ QUESTIONS 1-9: 1

## 2024-08-28 ASSESSMENT — ENCOUNTER SYMPTOMS
VOMITING: 0
BACK PAIN: 1
NAUSEA: 0

## 2024-08-28 ASSESSMENT — LIFESTYLE VARIABLES
HOW MANY STANDARD DRINKS CONTAINING ALCOHOL DO YOU HAVE ON A TYPICAL DAY: PATIENT DOES NOT DRINK
HOW OFTEN DO YOU HAVE A DRINK CONTAINING ALCOHOL: NEVER

## 2024-08-28 NOTE — PATIENT INSTRUCTIONS
exercise. For many people, walking is a good choice. Or you may want to swim, bike, or do other activities. Bit by bit, increase the time you're active every day. Try for at least 30 minutes on most days of the week.     Try to quit or cut back on using tobacco and other nicotine products. This includes smoking and vaping. If you need help quitting, talk to your doctor about stop-smoking programs and medicines. These can increase your chances of quitting for good. Quitting is one of the most important things you can do to protect your heart. It is never too late to quit. Try to avoid secondhand smoke too.     Stay at a weight that's healthy for you. Talk to your doctor if you need help losing weight.     Try to get 7 to 9 hours of sleep each night.     Limit alcohol to 2 drinks a day for men and 1 drink a day for women. Too much alcohol can cause health problems.     Manage other health problems such as diabetes, high blood pressure, and high cholesterol. If you think you may have a problem with alcohol or drug use, talk to your doctor.   Medicines    Take your medicines exactly as prescribed. Call your doctor if you think you are having a problem with your medicine.     If your doctor recommends aspirin, take the amount directed each day. Make sure you take aspirin and not another kind of pain reliever, such as acetaminophen (Tylenol).   When should you call for help?   Call 911 if you have symptoms of a heart attack. These may include:    Chest pain or pressure, or a strange feeling in the chest.     Sweating.     Shortness of breath.     Pain, pressure, or a strange feeling in the back, neck, jaw, or upper belly or in one or both shoulders or arms.     Lightheadedness or sudden weakness.     A fast or irregular heartbeat.   After you call 911, the  may tell you to chew 1 adult-strength or 2 to 4 low-dose aspirin. Wait for an ambulance. Do not try to drive yourself.  Watch closely for changes in your  health, and be sure to contact your doctor if you have any problems.  Where can you learn more?  Go to https://www.[employer].net/patientEd and enter F075 to learn more about \"A Healthy Heart: Care Instructions.\"  Current as of: June 24, 2023  Content Version: 14.1  © 1688-4781 [employer].   Care instructions adapted under license by [employer]. If you have questions about a medical condition or this instruction, always ask your healthcare professional. [employer] disclaims any warranty or liability for your use of this information.      Personalized Preventive Plan for Dante Rinaldi Jr. - 8/28/2024  Medicare offers a range of preventive health benefits. Some of the tests and screenings are paid in full while other may be subject to a deductible, co-insurance, and/or copay.    Some of these benefits include a comprehensive review of your medical history including lifestyle, illnesses that may run in your family, and various assessments and screenings as appropriate.    After reviewing your medical record and screening and assessments performed today your provider may have ordered immunizations, labs, imaging, and/or referrals for you.  A list of these orders (if applicable) as well as your Preventive Care list are included within your After Visit Summary for your review.    Other Preventive Recommendations:    A preventive eye exam performed by an eye specialist is recommended every 1-2 years to screen for glaucoma; cataracts, macular degeneration, and other eye disorders.  A preventive dental visit is recommended every 6 months.  Try to get at least 150 minutes of exercise per week or 10,000 steps per day on a pedometer .  Order or download the FREE \"Exercise & Physical Activity: Your Everyday Guide\" from [employer]. Call 1-299.511.7098 or search [employer] online.  You need 2207-6236 mg of calcium and 8946-9440 IU of vitamin D per day. It

## 2024-08-28 NOTE — PROGRESS NOTES
Medicare Annual Wellness Visit    Dante Vargas Nimco Norris is here for Medicare AWV (Pt presents today for AWV. Pt would like PSA. )    Assessment & Plan   Essential hypertension  -     Comprehensive Metabolic Panel; Future  -     CBC with Auto Differential; Future  -     Lipid Panel; Future  MICHAEL (obstructive sleep apnea)  -     Comprehensive Metabolic Panel; Future  History of pulmonary embolism  At high risk for venous thromboembolism  Chronic anticoagulation  -     CBC with Auto Differential; Future  Medicare annual wellness visit, subsequent    Recommendations for Preventive Services Due: see orders and patient instructions/AVS.  Recommended screening schedule for the next 5-10 years is provided to the patient in written form: see Patient Instructions/AVS.     Return in 6 months (on 2/28/2025).     Subjective   AWV and conditions listed.    Patient's complete Health Risk Assessment and screening values have been reviewed and are found in Flowsheets. The following problems were reviewed today and where indicated follow up appointments were made and/or referrals ordered.    Positive Risk Factor Screenings with Interventions:    Fall Risk:  Do you feel unsteady or are you worried about falling? : (!) yes  2 or more falls in past year?: no  Fall with injury in past year?: no     Interventions:    Reviewed medications, home hazards, visual acuity, and co-morbidities that can increase risk for falls  See AVS for additional education material    Cognitive:   Clock Drawing Test (CDT): (!) Abnormal  Words recalled: 0 Words Recalled  Total Score: (!) 0  Total Score Interpretation: Abnormal Mini-Cog  Interventions:  Patient with anxiety regarding wife's medical conditions; declines medications or counseling.  See AVS for additional education material  See A/P for plan and any pertinent orders        Controlled Medication Review:    Today's Pain Level: No data recorded   Opioid Risk: (Low risk score <55) Opioid risk score:

## 2024-08-28 NOTE — PROGRESS NOTES
Dante Rinaldi Jr. is a 83 y.o. male who presents today for the following:  Chief Complaint   Patient presents with    Medicare AWV     Pt presents today for AWV. Pt would like PSA.        Allergies   Allergen Reactions    Erythromycin Hives       Current Outpatient Medications   Medication Sig Dispense Refill    omeprazole (PRILOSEC) 20 MG delayed release capsule Take 1 capsule by mouth 2 times daily 180 capsule 3    amLODIPine (NORVASC) 5 MG tablet Take 1 tablet by mouth daily 90 tablet 3    apixaban (ELIQUIS) 2.5 MG TABS tablet Take 1 tablet by mouth 2 times daily 60 tablet 8    Apoaequorin (PREVAGEN) 10 MG CAPS Take 1 capsule by mouth 2 times daily       No current facility-administered medications for this visit.       Past Medical History:   Diagnosis Date    Acute pulmonary embolus (HCC) 2021    Arthritis of both ankles 2021    Bladder tumor     see copy of records    GERD (gastroesophageal reflux disease)     Hiatal hernia     Hypertension     Psychiatric disorder     Pulmonary emboli (HCC)     Pulmonary embolism (HCC) 2021    Sleep apnea        Past Surgical History:   Procedure Laterality Date    [place] UNLISTED PROCEDURE CARDIAC SURGERY      2019    UROLOGICAL SURGERY      bladder surgery for tumor       Social History     Tobacco Use    Smoking status: Former     Current packs/day: 0.00     Average packs/day: 1 pack/day for 15.0 years (15.0 ttl pk-yrs)     Types: Cigarettes     Start date: 1949     Quit date: 1964     Years since quittin.7    Smokeless tobacco: Never   Substance Use Topics    Alcohol use: Yes        Family History   Problem Relation Age of Onset    Cancer Paternal Aunt         9 sisters:  colon    Diabetes Sister     Cancer Sister         breast  60s    Diabetes Father     Heart Disease Father         MI 73 yo       Patient presents for follow up and AWV accompanied by his wife.  Hx of pulmonary embolism recurrence on chronic anticoagulation, MICHAEL,

## 2024-08-29 NOTE — RESULT ENCOUNTER NOTE
Cholesterol elevated.  Avoid fried, fast, or processed foods and foods high in saturated fat/cholesterol.  Consume dietary fiber 20-30 grams daily.  Exercise 150 minutes/week.  PSA elevated, referral generated to urology ([facility]) for further evaluation and recommendations.  B12 was <400 low.  Recommend vit b12 injection in the office 1000 mcg  SQ (may place order for co-signature) and then take supplement daily 500 mcg daily; order placed.    Please let me know if patient has any further questions or concerns.

## 2024-08-30 ENCOUNTER — NURSE ONLY (OUTPATIENT)
Dept: FAMILY MEDICINE CLINIC | Facility: CLINIC | Age: 84
End: 2024-08-30

## 2024-08-30 DIAGNOSIS — E53.8 B12 DEFICIENCY: Primary | ICD-10-CM

## 2024-08-30 RX ORDER — CYANOCOBALAMIN 1000 UG/ML
1000 INJECTION, SOLUTION INTRAMUSCULAR; SUBCUTANEOUS ONCE
Status: COMPLETED | OUTPATIENT
Start: 2024-08-30 | End: 2024-08-30

## 2024-08-30 RX ADMIN — CYANOCOBALAMIN 1000 MCG: 1000 INJECTION, SOLUTION INTRAMUSCULAR; SUBCUTANEOUS at 09:50

## 2025-01-05 DIAGNOSIS — K21.9 GASTRO-ESOPHAGEAL REFLUX DISEASE WITHOUT ESOPHAGITIS: ICD-10-CM

## 2025-03-09 DIAGNOSIS — K21.9 GASTRO-ESOPHAGEAL REFLUX DISEASE WITHOUT ESOPHAGITIS: ICD-10-CM

## 2025-03-09 DIAGNOSIS — I10 ESSENTIAL HYPERTENSION: ICD-10-CM

## 2025-03-10 RX ORDER — AMLODIPINE BESYLATE 5 MG/1
5 TABLET ORAL DAILY
Qty: 90 TABLET | Refills: 3 | Status: SHIPPED | OUTPATIENT
Start: 2025-03-10

## 2025-03-10 RX ORDER — OMEPRAZOLE 20 MG/1
20 CAPSULE, DELAYED RELEASE ORAL 2 TIMES DAILY
Qty: 180 CAPSULE | Refills: 1 | Status: SHIPPED | OUTPATIENT
Start: 2025-03-10

## 2025-05-15 ENCOUNTER — TELEPHONE (OUTPATIENT)
Dept: PULMONOLOGY | Age: 85
End: 2025-05-15

## 2025-05-15 NOTE — TELEPHONE ENCOUNTER
Called patient to schedule next appointment for Dr. Noel. Patient stated that he is in [place] and will be back in a week and wanted us to send reminder. Sending letter for patient to call         Return in about 1 year (around 7/30/2025).   With Bert/Teagan